# Patient Record
Sex: MALE | Race: WHITE | NOT HISPANIC OR LATINO | Employment: UNEMPLOYED | URBAN - METROPOLITAN AREA
[De-identification: names, ages, dates, MRNs, and addresses within clinical notes are randomized per-mention and may not be internally consistent; named-entity substitution may affect disease eponyms.]

---

## 2017-05-10 ENCOUNTER — OFFICE VISIT - HEALTHEAST (OUTPATIENT)
Dept: FAMILY MEDICINE | Facility: CLINIC | Age: 27
End: 2017-05-10

## 2017-05-10 DIAGNOSIS — Z71.84 COUNSELING FOR TRAVEL: ICD-10-CM

## 2017-09-28 ENCOUNTER — AMBULATORY - HEALTHEAST (OUTPATIENT)
Dept: LAB | Facility: CLINIC | Age: 27
End: 2017-09-28

## 2017-09-28 DIAGNOSIS — Z79.899 ENCOUNTER FOR LONG-TERM (CURRENT) USE OF OTHER MEDICATIONS: ICD-10-CM

## 2019-06-14 ENCOUNTER — COMMUNICATION - HEALTHEAST (OUTPATIENT)
Dept: SCHEDULING | Facility: CLINIC | Age: 29
End: 2019-06-14

## 2019-06-14 ENCOUNTER — OFFICE VISIT - HEALTHEAST (OUTPATIENT)
Dept: FAMILY MEDICINE | Facility: CLINIC | Age: 29
End: 2019-06-14

## 2019-06-14 DIAGNOSIS — H60.12 CELLULITIS OF AURICLE OF LEFT EAR: ICD-10-CM

## 2019-06-14 DIAGNOSIS — H60.392 INFECTIVE OTITIS EXTERNA, LEFT: ICD-10-CM

## 2020-01-05 ENCOUNTER — OFFICE VISIT - HEALTHEAST (OUTPATIENT)
Dept: FAMILY MEDICINE | Facility: CLINIC | Age: 30
End: 2020-01-05

## 2020-01-05 DIAGNOSIS — J11.1 INFLUENZA-LIKE ILLNESS: ICD-10-CM

## 2020-01-05 DIAGNOSIS — R69 ILLNESS, UNSPECIFIED: ICD-10-CM

## 2020-01-05 DIAGNOSIS — R06.2 WHEEZING: ICD-10-CM

## 2020-01-05 LAB
FLUAV AG SPEC QL IA: NORMAL
FLUBV AG SPEC QL IA: NORMAL

## 2020-01-05 RX ORDER — IBUPROFEN 200 MG
200-400 TABLET ORAL
Status: SHIPPED | COMMUNITY
Start: 2020-01-05

## 2020-01-05 RX ORDER — BRIVARACETAM 100 MG/1
250 TABLET, FILM COATED ORAL 2 TIMES DAILY
Status: SHIPPED | COMMUNITY
Start: 2019-12-23 | End: 2022-12-11

## 2020-01-05 RX ORDER — LACOSAMIDE 100 MG/1
550 TABLET ORAL
Status: SHIPPED | COMMUNITY
Start: 2019-07-22

## 2020-07-21 ENCOUNTER — AMBULATORY - HEALTHEAST (OUTPATIENT)
Dept: FAMILY MEDICINE | Facility: CLINIC | Age: 30
End: 2020-07-21

## 2020-07-21 ENCOUNTER — OFFICE VISIT - HEALTHEAST (OUTPATIENT)
Dept: FAMILY MEDICINE | Facility: CLINIC | Age: 30
End: 2020-07-21

## 2020-07-21 DIAGNOSIS — R10.13 ABDOMINAL PAIN, EPIGASTRIC: ICD-10-CM

## 2020-07-21 DIAGNOSIS — Z00.00 ROUTINE GENERAL MEDICAL EXAMINATION AT A HEALTH CARE FACILITY: ICD-10-CM

## 2020-07-21 RX ORDER — PREGABALIN 150 MG/1
450 CAPSULE ORAL
Status: SHIPPED | COMMUNITY
Start: 2020-06-24 | End: 2022-12-11

## 2020-07-21 ASSESSMENT — MIFFLIN-ST. JEOR: SCORE: 1992.73

## 2020-07-22 ENCOUNTER — AMBULATORY - HEALTHEAST (OUTPATIENT)
Dept: LAB | Facility: CLINIC | Age: 30
End: 2020-07-22

## 2020-07-22 DIAGNOSIS — Z00.00 ROUTINE GENERAL MEDICAL EXAMINATION AT A HEALTH CARE FACILITY: ICD-10-CM

## 2020-07-22 DIAGNOSIS — R10.13 ABDOMINAL PAIN, EPIGASTRIC: ICD-10-CM

## 2020-07-22 LAB
ALBUMIN SERPL-MCNC: 4.6 G/DL (ref 3.5–5)
ALP SERPL-CCNC: 67 U/L (ref 45–120)
ALT SERPL W P-5'-P-CCNC: 35 U/L (ref 0–45)
ANION GAP SERPL CALCULATED.3IONS-SCNC: 12 MMOL/L (ref 5–18)
AST SERPL W P-5'-P-CCNC: 23 U/L (ref 0–40)
BASOPHILS # BLD AUTO: 0.1 THOU/UL (ref 0–0.2)
BASOPHILS NFR BLD AUTO: 1 % (ref 0–2)
BILIRUB SERPL-MCNC: 0.9 MG/DL (ref 0–1)
BUN SERPL-MCNC: 16 MG/DL (ref 8–22)
CALCIUM SERPL-MCNC: 9.2 MG/DL (ref 8.5–10.5)
CHLORIDE BLD-SCNC: 109 MMOL/L (ref 98–107)
CHOLEST SERPL-MCNC: 179 MG/DL
CO2 SERPL-SCNC: 20 MMOL/L (ref 22–31)
CREAT SERPL-MCNC: 1.12 MG/DL (ref 0.7–1.3)
EOSINOPHIL # BLD AUTO: 0.2 THOU/UL (ref 0–0.4)
EOSINOPHIL NFR BLD AUTO: 2 % (ref 0–6)
ERYTHROCYTE [DISTWIDTH] IN BLOOD BY AUTOMATED COUNT: 12.3 % (ref 11–14.5)
FASTING STATUS PATIENT QL REPORTED: YES
GFR SERPL CREATININE-BSD FRML MDRD: >60 ML/MIN/1.73M2
GLUCOSE BLD-MCNC: 82 MG/DL (ref 70–125)
HCT VFR BLD AUTO: 49.1 % (ref 40–54)
HDLC SERPL-MCNC: 34 MG/DL
HGB BLD-MCNC: 16.7 G/DL (ref 14–18)
LDLC SERPL CALC-MCNC: 76 MG/DL
LYMPHOCYTES # BLD AUTO: 2 THOU/UL (ref 0.8–4.4)
LYMPHOCYTES NFR BLD AUTO: 27 % (ref 20–40)
MCH RBC QN AUTO: 29.7 PG (ref 27–34)
MCHC RBC AUTO-ENTMCNC: 34 G/DL (ref 32–36)
MCV RBC AUTO: 87 FL (ref 80–100)
MONOCYTES # BLD AUTO: 0.7 THOU/UL (ref 0–0.9)
MONOCYTES NFR BLD AUTO: 9 % (ref 2–10)
NEUTROPHILS # BLD AUTO: 4.4 THOU/UL (ref 2–7.7)
NEUTROPHILS NFR BLD AUTO: 60 % (ref 50–70)
PLATELET # BLD AUTO: 185 THOU/UL (ref 140–440)
PMV BLD AUTO: 11.4 FL (ref 8.5–12.5)
POTASSIUM BLD-SCNC: 3.8 MMOL/L (ref 3.5–5)
PROT SERPL-MCNC: 7 G/DL (ref 6–8)
RBC # BLD AUTO: 5.62 MILL/UL (ref 4.4–6.2)
SODIUM SERPL-SCNC: 141 MMOL/L (ref 136–145)
TRIGL SERPL-MCNC: 343 MG/DL
WBC: 7.4 THOU/UL (ref 4–11)

## 2020-08-11 ENCOUNTER — OFFICE VISIT - HEALTHEAST (OUTPATIENT)
Dept: FAMILY MEDICINE | Facility: CLINIC | Age: 30
End: 2020-08-11

## 2020-08-11 DIAGNOSIS — M62.81 MUSCLE WEAKNESS (GENERALIZED): ICD-10-CM

## 2020-08-11 DIAGNOSIS — R21 RASH: ICD-10-CM

## 2020-08-11 LAB
ALBUMIN SERPL-MCNC: 4.8 G/DL (ref 3.5–5)
ALBUMIN UR-MCNC: NEGATIVE MG/DL
ALP SERPL-CCNC: 63 U/L (ref 45–120)
ALT SERPL W P-5'-P-CCNC: 66 U/L (ref 0–45)
ANION GAP SERPL CALCULATED.3IONS-SCNC: 12 MMOL/L (ref 5–18)
APPEARANCE UR: CLEAR
AST SERPL W P-5'-P-CCNC: 35 U/L (ref 0–40)
ATRIAL RATE - MUSE: 54 BPM
BASOPHILS # BLD AUTO: 0 THOU/UL (ref 0–0.2)
BASOPHILS NFR BLD AUTO: 1 % (ref 0–2)
BILIRUB SERPL-MCNC: 1.1 MG/DL (ref 0–1)
BILIRUB UR QL STRIP: NEGATIVE
BUN SERPL-MCNC: 12 MG/DL (ref 8–22)
CALCIUM SERPL-MCNC: 10 MG/DL (ref 8.5–10.5)
CHLORIDE BLD-SCNC: 108 MMOL/L (ref 98–107)
CO2 SERPL-SCNC: 21 MMOL/L (ref 22–31)
COLOR UR AUTO: YELLOW
CREAT SERPL-MCNC: 1.09 MG/DL (ref 0.7–1.3)
DIASTOLIC BLOOD PRESSURE - MUSE: NORMAL
EOSINOPHIL # BLD AUTO: 0.2 THOU/UL (ref 0–0.4)
EOSINOPHIL NFR BLD AUTO: 3 % (ref 0–6)
ERYTHROCYTE [DISTWIDTH] IN BLOOD BY AUTOMATED COUNT: 12.1 % (ref 11–14.5)
GFR SERPL CREATININE-BSD FRML MDRD: >60 ML/MIN/1.73M2
GLUCOSE BLD-MCNC: 94 MG/DL (ref 70–125)
GLUCOSE UR STRIP-MCNC: NEGATIVE MG/DL
HCT VFR BLD AUTO: 52.2 % (ref 40–54)
HGB BLD-MCNC: 17.7 G/DL (ref 14–18)
HGB UR QL STRIP: NEGATIVE
INTERPRETATION ECG - MUSE: NORMAL
KETONES UR STRIP-MCNC: NEGATIVE MG/DL
LEUKOCYTE ESTERASE UR QL STRIP: NEGATIVE
LYMPHOCYTES # BLD AUTO: 1.4 THOU/UL (ref 0.8–4.4)
LYMPHOCYTES NFR BLD AUTO: 23 % (ref 20–40)
MCH RBC QN AUTO: 30.7 PG (ref 27–34)
MCHC RBC AUTO-ENTMCNC: 33.9 G/DL (ref 32–36)
MCV RBC AUTO: 91 FL (ref 80–100)
MONOCYTES # BLD AUTO: 0.5 THOU/UL (ref 0–0.9)
MONOCYTES NFR BLD AUTO: 7 % (ref 2–10)
NEUTROPHILS # BLD AUTO: 4.1 THOU/UL (ref 2–7.7)
NEUTROPHILS NFR BLD AUTO: 67 % (ref 50–70)
NITRATE UR QL: NEGATIVE
P AXIS - MUSE: 41 DEGREES
PH UR STRIP: 6.5 [PH] (ref 5–8)
PLATELET # BLD AUTO: 161 THOU/UL (ref 140–440)
PMV BLD AUTO: 9.1 FL (ref 7–10)
POTASSIUM BLD-SCNC: 4.1 MMOL/L (ref 3.5–5)
PR INTERVAL - MUSE: 160 MS
PROT SERPL-MCNC: 7.6 G/DL (ref 6–8)
QRS DURATION - MUSE: 96 MS
QT - MUSE: 414 MS
QTC - MUSE: 392 MS
R AXIS - MUSE: 60 DEGREES
RBC # BLD AUTO: 5.76 MILL/UL (ref 4.4–6.2)
SODIUM SERPL-SCNC: 141 MMOL/L (ref 136–145)
SP GR UR STRIP: 1.01 (ref 1–1.03)
SYSTOLIC BLOOD PRESSURE - MUSE: NORMAL
T AXIS - MUSE: 3 DEGREES
TSH SERPL DL<=0.005 MIU/L-ACNC: 2.28 UIU/ML (ref 0.3–5)
UROBILINOGEN UR STRIP-ACNC: NORMAL
VENTRICULAR RATE- MUSE: 54 BPM
WBC: 6.2 THOU/UL (ref 4–11)

## 2020-08-12 LAB — B BURGDOR IGG+IGM SER QL: 0.03 INDEX VALUE

## 2020-08-13 ENCOUNTER — AMBULATORY - HEALTHEAST (OUTPATIENT)
Dept: FAMILY MEDICINE | Facility: CLINIC | Age: 30
End: 2020-08-13

## 2020-08-13 DIAGNOSIS — M62.81 MUSCLE WEAKNESS (GENERALIZED): ICD-10-CM

## 2020-08-13 LAB
A PHAGOCYTOPH IGG TITR SER IF: NORMAL {TITER}
A PHAGOCYTOPH IGM TITR SER IF: NORMAL {TITER}
E CHAFFEENSIS IGG TITR SER IF: NORMAL {TITER}
E CHAFFEENSIS IGM TITR SER IF: NORMAL {TITER}

## 2020-08-15 ENCOUNTER — COMMUNICATION - HEALTHEAST (OUTPATIENT)
Dept: SCHEDULING | Facility: CLINIC | Age: 30
End: 2020-08-15

## 2020-08-15 LAB
B MICROTI DNA BLD QL NAA+PROBE: NOT DETECTED
BABESIA DNA BLD QL NAA+PROBE: NOT DETECTED

## 2020-09-23 ENCOUNTER — RECORDS - HEALTHEAST (OUTPATIENT)
Dept: ADMINISTRATIVE | Facility: OTHER | Age: 30
End: 2020-09-23

## 2020-10-26 ENCOUNTER — RECORDS - HEALTHEAST (OUTPATIENT)
Dept: ADMINISTRATIVE | Facility: OTHER | Age: 30
End: 2020-10-26

## 2020-10-30 ENCOUNTER — RECORDS - HEALTHEAST (OUTPATIENT)
Dept: ADMINISTRATIVE | Facility: OTHER | Age: 30
End: 2020-10-30

## 2020-12-09 ENCOUNTER — RECORDS - HEALTHEAST (OUTPATIENT)
Dept: ADMINISTRATIVE | Facility: OTHER | Age: 30
End: 2020-12-09

## 2021-01-08 ENCOUNTER — AMBULATORY - HEALTHEAST (OUTPATIENT)
Dept: CARE COORDINATION | Facility: CLINIC | Age: 31
End: 2021-01-08

## 2021-01-08 DIAGNOSIS — R68.89 SPELLS OF DECREASED ATTENTIVENESS: ICD-10-CM

## 2021-01-11 ENCOUNTER — COMMUNICATION - HEALTHEAST (OUTPATIENT)
Dept: NURSING | Facility: CLINIC | Age: 31
End: 2021-01-11

## 2021-02-03 ENCOUNTER — RECORDS - HEALTHEAST (OUTPATIENT)
Dept: ADMINISTRATIVE | Facility: OTHER | Age: 31
End: 2021-02-03

## 2021-03-13 ENCOUNTER — RECORDS - HEALTHEAST (OUTPATIENT)
Dept: ADMINISTRATIVE | Facility: OTHER | Age: 31
End: 2021-03-13

## 2021-03-14 ENCOUNTER — RECORDS - HEALTHEAST (OUTPATIENT)
Dept: ADMINISTRATIVE | Facility: OTHER | Age: 31
End: 2021-03-14

## 2021-05-02 ENCOUNTER — HEALTH MAINTENANCE LETTER (OUTPATIENT)
Age: 31
End: 2021-05-02

## 2021-05-29 NOTE — TELEPHONE ENCOUNTER
Call from pharmacy      Ciprodex not covered     Requesting alternative       [facility] [place]       Patient waiting at pharmacy        Tele# [phone]        Ricky Tolbert RN   Triage and Medication Refills

## 2021-05-29 NOTE — PROGRESS NOTES
Assessment/Plan:   Infective otitis externa, left  Cellulitis of auricle of left ear  Left external ear canal swelling and redness with purulent debris obstructing the TM. Redness, warmth and tenderness outside the meatus of the canal as well. Cannot rule out otitis media. Insurance didn't cover ciprodex, replaced with cortisporin along with cefdinir orally. Remote history of penicillin allergy  I discussed red flag symptoms, return precautions to clinic/ER and follow up care with patient/parent.  Expected clinical course, symptomatic cares advised. Questions answered. Patient/parent amenable with plan.  - neomycin-polymyxin-hydrocortisone (CORTISPORIN) otic solution; Administer 3 drops into the left ear 4 (four) times a day for 10 days.  Dispense: 10 mL; Refill: 0  - cefdinir (OMNICEF) 300 MG capsule; Take 1 capsule (300 mg total) by mouth 2 (two) times a day for 10 days. Take with food. Take probiotic while on antibiotic.  Dispense: 20 capsule; Refill: 0    Cipro-dex drops twice a day for 7 days left ear - this was changed to cortisporin four times a day for 10 days due to insurance coverage (lack of)  Cefdinir twice a day for 10 days  Recheck if worse or no better    Subjective:      Phoenix Win is a 28 y.o. male who presents with ear pain and fullness left side, for the last 2 days. He developed URI about a week ago which is better except for a mild lingering dry cough. No sinus pain, pressure, congestion anymore. 2 days ago his left ear started to feel plugged and last night and this morning his hearing has been decreased. The ear has become tender to touch and move. He has noted 'runny ear wax' from the ear for 2 days. No fever. No HA, no vertigo. No recent swimming, no plane travel. No injury or trauma. He has history of OM. No N/V/D, no rash. Non smoker.     Allergies   Allergen Reactions     Amoxicillin      Penicillins      Current Outpatient Medications on File Prior to Visit   Medication Sig  Dispense Refill     calcium-vitamin D 500 mg(1,250mg) -200 unit per tablet Take 1 tablet by mouth every morning.       levETIRAcetam (KEPPRA) 1000 MG tablet Take 1,500 mg by mouth 2 (two) times a day.        atovaquone-proguanil (MALARONE) 250-100 mg Tab Take 1 tablet by mouth daily with breakfast. Start 2 days before travel and continue until 7 days after you have returned to [place] tablet 0     CARBATROL 200 mg 12 hr capsule Take 400 mg by mouth 2 (two) times a day.        VIMPAT 200 mg Tab 250 mg.         3     No current facility-administered medications on file prior to visit.      Patient Active Problem List   Diagnosis     Epilepsy       Objective:     /81 (Patient Site: Right Arm, Patient Position: Sitting, Cuff Size: Adult Regular)   Pulse 72   Temp 98.7  F (37.1  C) (Oral)   Resp 15   Wt 220 lb (99.8 kg)   SpO2 96%   BMI 34.46 kg/m      Physical  General Appearance: Alert, cooperative, no distress, AVSS  Head: Normocephalic, without obvious abnormality, atraumatic  Eyes: Conjunctivae are normal.   Ears: Normal TM and external ear canal on the right. The left ear is painful with retraction of the pinna and pressure on the tragus. Left external ear canal swelling and redness with purulent debris obstructing view of the TM. Redness, warmth and tenderness of a portion of the pinna outside the meatus of the canal as well. Decreased hearing left side  Nose: No significant congestion. No sinus pain with percussion  Throat: Throat is normal.  No exudate.  No significant lesions  Neck: No adenopathy  Lungs: Clear to auscultation bilaterally, respirations unlabored  Heart: Regular rate and rhythm  Skin: Skin color, texture, turgor normal, no rashes or lesions  Psychiatric: Patient has a normal mood and affect.

## 2021-05-29 NOTE — PATIENT INSTRUCTIONS - HE
Cipro-dex drops twice a day for 7 days left ear  Cefdinir twice a day for 10 days  Recheck if worse or no better

## 2021-05-30 VITALS — BODY MASS INDEX: 33.45 KG/M2 | WEIGHT: 213.56 LBS

## 2021-06-03 VITALS
TEMPERATURE: 98.4 F | DIASTOLIC BLOOD PRESSURE: 82 MMHG | OXYGEN SATURATION: 96 % | HEART RATE: 85 BPM | BODY MASS INDEX: 36.18 KG/M2 | SYSTOLIC BLOOD PRESSURE: 123 MMHG | WEIGHT: 231 LBS | RESPIRATION RATE: 20 BRPM

## 2021-06-03 VITALS — WEIGHT: 220 LBS | BODY MASS INDEX: 34.46 KG/M2

## 2021-06-04 VITALS
TEMPERATURE: 98.6 F | DIASTOLIC BLOOD PRESSURE: 82 MMHG | RESPIRATION RATE: 15 BRPM | WEIGHT: 241 LBS | HEART RATE: 69 BPM | OXYGEN SATURATION: 98 % | SYSTOLIC BLOOD PRESSURE: 126 MMHG | BODY MASS INDEX: 37.75 KG/M2

## 2021-06-04 VITALS
HEIGHT: 67 IN | DIASTOLIC BLOOD PRESSURE: 78 MMHG | SYSTOLIC BLOOD PRESSURE: 132 MMHG | BODY MASS INDEX: 37.51 KG/M2 | WEIGHT: 239 LBS | HEART RATE: 75 BPM | OXYGEN SATURATION: 98 %

## 2021-06-04 NOTE — PATIENT INSTRUCTIONS - HE
No pneumonia on xray today.    Start remaining prednisone tomorrow.    Albuterol inhaler with spacer every 4 hours for wheezing and shortness of breath.    If your symptoms worsen in any way, go immediately to the ER.

## 2021-06-04 NOTE — PROGRESS NOTES
ASSESSMENT:   1. Influenza-like illness  Influenza A/B Rapid Test    XR Chest 2 Views   2. Wheezing  ipratropium-albuterol 0.5-2.5 mg/3 mL nebulizer solution 3 mL (DUO-NEB)    predniSONE tablet 60 mg (DELTASONE)    ipratropium-albuterol 0.5-2.5 mg/3 mL nebulizer solution 3 mL (DUO-NEB)    predniSONE (DELTASONE) 20 MG tablet    albuterol (PROAIR HFA;PROVENTIL HFA;VENTOLIN HFA) 90 mcg/actuation inhaler    Spacer W/O Mask       PLAN:  Chest x-ray without infiltrate or effusion on my review.  Influenza swab negative.  Patient did have 2 DuoNeb's in clinic with significant symptomatic improvement and significant improvement in wheezing.  Was given a 60 mg dose of prednisone here in clinic, will continue prednisone at 40 mg daily for the next 4 days.  Prescribed an albuterol inhaler to use every 4-6 hours as needed at home.  Did discuss with patient that he should have a very low threshold for reevaluation should fevers or worsening symptoms redevelop.  He is amenable with this plan.    I discussed red flag symptoms, return precautions to clinic/ER and follow up care with patient/parent.  Expected clinical course, symptomatic cares advised. Questions answered. Patient/parent amenable with plan.    Patient Instructions:  Patient Instructions   No pneumonia on xray today.    Start remaining prednisone tomorrow.    Albuterol inhaler with spacer every 4 hours for wheezing and shortness of breath.    If your symptoms worsen in any way, go immediately to the ER.      SUBJECTIVE:   Phoenix Win is a 29 y.o. male who presents today with cough, productive with green sputum for the past 4 days.  shortness of breath. CONROY. No hemoptysis.  No chest pain.  Did have influenza vaccine this year.  No known ill contacts.      ROS:  Comprehensive 12 pt ROS completed, positives noted in HPI, otherwise negative.      Past Medical History:  Patient Active Problem List   Diagnosis     Epilepsy       Surgical History:  No past surgical  history on file.        Family History:  No family history on file.    Reviewed; Non-contributory    Social History     Tobacco Use   Smoking Status Never Smoker   Smokeless Tobacco Never Used       Current Medications:  Current Outpatient Medications on File Prior to Visit   Medication Sig Dispense Refill     atovaquone-proguanil (MALARONE) 250-100 mg Tab Take 1 tablet by mouth daily with breakfast. Start 2 days before travel and continue until 7 days after you have returned to [place] tablet 0     BRIVIACT 100 mg Tab tablet TAKE 2 TABLETS BY MOUTH WITH ONE 50MG TABLET TWICE DAILY TOTAL DOSE IS 250MG TWICE DAILY       BRIVIACT 50 mg Tab tablet TAKE ONE TABLET WITH TWO 100MG TABLETS TWICE DAILY FOR A TOTAL DOSE  TWICE DAILY       calcium carbonate (OS-ESSENCE) 600 mg calcium (1,500 mg) tablet Take 600 mg by mouth.       calcium-vitamin D 500 mg(1,250mg) -200 unit per tablet Take 1 tablet by mouth every morning.       CARBATROL 200 mg 12 hr capsule Take 400 mg by mouth 2 (two) times a day.        lacosamide (VIMPAT) 100 mg Tab Take 250 mg (2.5 tab) oral every AM and 300 mg (3 tab) every PM.       levETIRAcetam (KEPPRA) 1000 MG tablet Take 1,500 mg by mouth 2 (two) times a day.        LORazepam (ATIVAN) 2 mg/mL concentrated solution Give 1 mL (2mg) for generalized tonic clonic seizure > 3 min. Call 911 if seizure continues additional 10 min. Max dose 8mg/24hr.       neomycin-polymyxin-hydrocortisone (CORTISPORIN) 3.5-10,000-1 mg/mL-unit/mL-% otic suspension Administer 3 drops into the left ear 4 (four) times a day for 10 days.       VIMPAT 200 mg Tab 250 mg.         3     ibuprofen (ADVIL,MOTRIN) 200 MG tablet Take 200-400 mg by mouth.       No current facility-administered medications on file prior to visit.        Allergies:   Allergies   Allergen Reactions     Penicillins      Amoxicillin Rash       OBJECTIVE:   Vitals:    01/05/20 1403   BP: 123/82   Patient Site: Right Arm   Patient Position: Sitting   Pulse:  85   Resp: 20   Temp: 98.4  F (36.9  C)   TempSrc: Oral   SpO2: 96%   Weight: (!) 231 lb (104.8 kg)     Physical exam reveals a pleasant 29 y.o. male.   General: Appears healthy, alert and cooperative. Non-toxic appearance.  Eyes:  No conjunctivitis, lids normal.   Ears:  Normal appearing auricle. External auditory meatus without excessive cerumen, edema or erythema. normal TMs bilaterally and normal canals bilaterally.  No mastoid tenderness. No pain with palpation over tragus.  Nose:    Mucosa normal. Clear rhinorrhea. No sinus tenderness.  Mouth:  Mucosa pink and moist.  no pharyngitis, no exudate. No trismus. No evidence of PTA. Normal phonation.  Neck: no adenopathy  Pulmonary/Chest: Diminished breath sounds, expiratory wheezes bilaterally. Symmetric air entry throughout both lung fields. Symmetrical chest wall movement.  Heart: regular rate and rhythm, no murmur, rub or gallop  Neuro: Alert, oriented. Non-focal.  Skin: pink, warm, dry, and without lesions on limited skin exam. No rashes.  Psychiatric: Normal mood and affect.  Normal judgement and thought content. Normal behavior.       RADIOLOGY    Xr Chest 2 Views    Result Date: 1/5/2020  EXAM: XR CHEST 2 VIEWS LOCATION: [facility] DATE/TIME: 1/5/2020 2:52 PM INDICATION: fever, cough, sob COMPARISON: 01/10/2015     Negative chest.      LABORATORY STUDIES    Recent Results (from the past 24 hour(s))   Influenza A/B Rapid Test   Result Value Ref Range    Influenza  A, Rapid Antigen No Influenza A antigen detected No Influenza A antigen detected    Influenza B, Rapid Antigen No Influenza B antigen detected No Influenza B antigen detected           Cyndi Marrero, CNP

## 2021-06-09 NOTE — PROGRESS NOTES
" Patient ID: Phoenix Win is a 30 y.o. male.  /78   Pulse 75   Ht 5' 7\" (1.702 m)   Wt (!) 239 lb (108.4 kg)   SpO2 98%   BMI 37.43 kg/m      Assessment/Plan:                   Diagnoses and all orders for this visit:    Routine general medical examination at a health care facility  -     Comprehensive Metabolic Panel; Future; Expected date: 07/21/2020  -     HM1(CBC and Differential); Future; Expected date: 07/21/2020  -     Lipid [place] FASTING; Future; Expected date: 07/21/2020    Abdominal pain, epigastric  -     HM1(CBC and Differential); Future; Expected date: 07/21/2020           DISCUSSION  Discussed routine health prevention.  Recommend returning for fasting labs including blood sugar and cholesterol.  Recommend increasing frequency of exercise.  Recommend improvement in diet where appropriate.  Immunizations are up-to-date.  No indication for any specific cancer screening tests at this point.    Abdominal discomfort likely representative of intermittent mild gastritis possibly associated with NSAIDs.  Recommend taking NSAIDs only with food and keeping treat minimum as possible.  We will check additional labs including hemogram.  Subjective:     HPI    Phoenix Win is a 30-year-old man who is here today for routine physical.  His medical history includes seizure disorder diagnosed as epilepsy and follows with neurology.  History of seizures for 19 years.  He has generalized tonic-clonic seizures.  On occasion has other types of seizures.  Reports last seizure was last Saturday.  Patient reports he works closely with his neurologist with medication adjustments.  He seems to be doing relatively well most recently.  We reviewed his current medications.  Reviewed other records that were pertinent.    Other medical history includes hospitalization for pneumonia back in 2015.  He does not have any history of high blood pressure, high blood sugar, he does have a history of elevated " triglycerides.  He has not had any surgeries.    Family history significant for father with gout, mother with thyroid disorder, brother with gout, mother with arthritis, father with history of ulcers, 2 younger sisters that are generally healthy but seem to pass out easily, maternal grandfather with gout and history of heart disease, paternal grandfather  of some type of cancer possibly related to chemical exposure via.    Social history patient is not .  He resides with his parents, his mother does help him manage seizures when they occur.  He does not drive.  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  He does not smoke no history of smoking.  Does not drink alcohol or caffeine.    Review of systems patient describes having intermittent left sided to epigastric abdominal discomfort that lasts 30 minutes to an hour occurs sporadically has not occurred in 1 week.    Denies more specifically concerns such as chest pain shortness of breath dizziness lightheadedness syncope.      Review of Systems  Complete review of systems is obtained.  Other than the specific considerations noted above complete review of systems is negative.          Objective:   Medications:  Current Outpatient Medications   Medication Sig     BRIVIACT 100 mg Tab tablet 250 mg 2 (two) times a day. 500mg total per day     calcium-vitamin D 500 mg(1,250mg) -200 unit per tablet Take 1 tablet by mouth every morning.     lacosamide (VIMPAT) 100 mg Tab 550 mg.      pregabalin (LYRICA) 150 MG capsule 450 mg.      ibuprofen (ADVIL,MOTRIN) 200 MG tablet Take 200-400 mg by mouth.       Allergies:  Allergies   Allergen Reactions     Penicillins      Amoxicillin Rash       Tobacco:   reports that he has never smoked. He has never used smokeless tobacco.    HEALTH PREVENTION    General  Dental care: Discussed the importance of  "regular dental care.  Eye care: Discussed importance of routine eye exams for glaucoma screening      Wt Readings from Last 3 Encounters:   07/21/20 (!) 239 lb (108.4 kg)   01/05/20 (!) 231 lb (104.8 kg)   06/14/19 220 lb (99.8 kg)     Body mass index is 37.43 kg/m .    The following high BMI interventions were performed this visit: encouragement to exercise    Cancer screening    Indication for any specific cancer screening tests    Cholesterol:   Y  LDL Calculated (no units)   Date Value   10/03/2013   Invalid, Triglyceride >300      Blood Pressure:   BP Readings from Last 3 Encounters:   07/21/20 132/78   01/05/20 123/82   06/14/19 123/81       Immunization History   Administered Date(s) Administered     DTP 1988, 1988, 1988, 02/13/1990, 06/30/1993     Hep A, Adult IM (19yr & older) 05/10/2017     Influenza high dose,seasonal,PF, 65+ yrs 09/04/2017     Influenza, inj, historic,unspecified 10/15/1998, 10/14/1999, 11/25/2003, 10/25/2007, 11/28/2008, 10/03/2009, 12/15/2013, 09/04/2017     Influenza,seasonal, Inj IIV3 10/15/1998, 10/14/1999, 11/25/2003, 10/25/2007, 11/28/2008, 10/03/2009, 12/15/2013     Influenza,seasonal,quad inj =/> 6months 10/09/2017, 11/01/2018     MMR 11/06/1989, 06/30/1993     Meningococcal MCV4 Conjugate,Unspecified 11/06/2007     Meningococcal MCV4P 11/06/2007     OPV,Trivalent,Historic(7103-7100 only) 1988, 1988, 02/13/1990, 06/30/1993     Pneumo Polysac 23-V 01/13/2015     Td, adult adsorbed, PF 06/01/2006     Tdap 05/10/2017     Typhoid Live, Oral 05/10/2017     Varicella 04/04/1996     There are no preventive care reminders to display for this patient.     Physical Exam          /78   Pulse 75   Ht 5' 7\" (1.702 m)   Wt (!) 239 lb (108.4 kg)   SpO2 98%   BMI 37.43 kg/m            General Appearance:    Alert, cooperative, no distress   Eyes:   No scleral icterus or conjunctival irritation       Ears:    Normal TM's and external ear canals, both ears "   Throat:   Lips, mucosa, and tongue normal; teeth and gums normal   Neck:   Supple, symmetrical, trachea midline, no adenopathy;        thyroid:  No enlargement/tenderness/nodules   Lungs:     Clear to auscultation bilaterally, respirations unlabored, no wheezes or crackles   Heart:    Regular rate and rhythm,  No murmur   Abdomen:    Soft, no distention, no tenderness on palpation, no masses, no organomegaly     Extremities:  No edema, no joint swelling or redness, no evidence of any injuries   Skin:  No concerning skin findings, no suspicious moles, no rashes   Neurologic:  On gross examination there is no motor or sensory deficit.  Patient walks with a normal gait     Genitalia:   Normal testicular anatomy, no inguinal hernias, no skin findings in the genital region

## 2021-06-10 NOTE — PROGRESS NOTES
Assessment  1. Counseling for travel         Plan    1.  Travel consultation: Reviewed CDC recommendations for travel to [place].  Administered Adacel and hepatitis A vaccines today in clinic.  Discussed side effects of vaccines.  Recommend second dose of hepatitis a vaccine in 6 months.  Provided prescription for oral typhoid vaccine and counseled on use and side effects.  Provided prescription for Japanese encephalitis vaccine and directed him to a local travel clinic for this vaccine.  Prescription for Malarone for malaria prophylaxis was provided.  Discussed he should start this 2 days before travel and continue daily until 7 days after he has returned to the [place].  Also provided prescription for Cipro to use as needed for traveler's diarrhea.  Discussed importance of food and water safety.  Discussed importance of repellent and precautions against mosquito and tick bites.  Discussed safe travel practices and importance of use of sunscreen.      Subjective  Phoenix Win is a 26 y.o. male who comes in today for travel consultation.  He is planning to travel to [place] leaving June 13 and returning August 13th.  He will be doing an intensive Chinese language program.  Most of his time will be in the city of [facility].  He is due for a tetanus booster.  He has had all 3 of his hepatitis B vaccine injections.  He has not had an hepatitis A vaccine series.  He has a seizure disorder and takes medication for management of that.  Otherwise healthy individual.  Allergies and medications are reviewed and updated in the chart.  No other concerns or questions today.    Current Outpatient Prescriptions   Medication Sig Dispense Refill     calcium-vitamin D 500 mg(1,250mg) -200 unit per tablet Take 1 tablet by mouth every morning.       CARBATROL 200 mg 12 hr capsule Take 400 mg by mouth 2 (two) times a day.        levETIRAcetam (KEPPRA) 1000 MG tablet Take 1,500 mg by mouth 2 (two) times a day.         atovaquone-proguanil (MALARONE) 250-100 mg Tab Take 1 tablet by mouth daily with breakfast. Start 2 days before travel and continue until 7 days after you have returned to [place] tablet 0     ciprofloxacin HCl (CIPRO) 500 MG tablet Take 1 tablet (500 mg total) by mouth 2 (two) times a day for 3 days. Take as needed for traveler's diarrhea 6 tablet 0     typhoid (VIVOTIF) SR capsule Take 1 capsule by mouth every other day for 7 days. 4 capsule 0     No current facility-administered medications for this visit.          Objective   /70 (Patient Site: Left Arm, Patient Position: Sitting, Cuff Size: Adult Large)  Pulse 71  Temp 98.6  F (37  C) (Tympanic)   Wt 213 lb 9 oz (96.9 kg)  SpO2 97%  BMI 33.45 kg/m2      Gen: alert, no acute distress  HEENT;  NCAT  Neck: supple  Ext: warm, dry, no edema  Psych: mood appropriate, affect normal

## 2021-06-10 NOTE — PROGRESS NOTES
Assessment & Plan:       1. Muscle weakness (generalized)  HM1(CBC and Differential)    Urinalysis-UC if Indicated    Symptomatic COVID-19 Virus (CORONAVIRUS) PCR    Comprehensive Metabolic Panel    Lyme Antibody Cascade    Anaplasma phagocytophilum (HGA) Antibodies, IgG and IgM    Ehrlichia chaffeensis Antibodies, IgG / IgM by IFA    Babesia Species by PCR    Thyroid Stimulating Hormone (TSH)    Electrocardiogram Perform and Read    HM1 (CBC with Diff)   2. Rash  triamcinolone (KENALOG) 0.1 % cream      Medical Decision Making  Patient presents for multiple symptoms including muscle fatigue, dizziness, and rash.  Some of his symptoms have been borderline chronic since changing his epilepsy medications.  These include the general fatigue and dizziness described as imbalance.  It appears that these are side effects of the patient's medications.  However, more concerning is the patient's acute onset muscle fatigue and rash.  The rash does appear most consistent with patient's previous diagnosis of eczema versus side effect of the patient's medications in conjunction with sun exposure.  Will prescribe a stronger topical steroid and recommend patient continue to avoid sun exposure.  The rash otherwise does not appear to be cellulitis, contact dermatitis, or an allergic reaction.  Differential for muscle fatigue includes but is not limited to COVID-19, hypoglycemia, anemia, cardiac etiology, hypothyroidism, urinary tract infection, and tickborne Illnesses.  Patient's ECG showed normal sinus bradycardia similar to his previous ECG.  CBC shows normal white blood cell count and no signs of anemia.  His urine analysis is also negative for urinary tract infection as well as glucosuria.  There is in process tickborne illness panel, thyroid cascade, and CMP.  Will contact patient by phone when results are abnormal.  Otherwise, if results are normal will push to my chart.  Recommended patient follow-up with his epilepsy care  team to discuss possible side effects of his epilepsy medication being the cause of his symptoms.  Ordered a curbside COVID-19 test.  Discussed self-isolation and prevention of spreading illness.  Discussed signs of worsening symptoms and when to follow-up with PCP if no symptom improvement.    Patient had symptoms possibly consistent with COVID-19.  Thus, protective precautions were taken including appropriate facemask, face shield, gown, and gloves.      Subjective:       Phoenix Win is a 30 y.o. male epilepsy, here for evaluation of dizziness, muscle fatigue, and rash.  Onset of symptoms was 1 to 2 months ago with acute worsening since then.  Patient notes that he developed general fatigue dizziness since changing to his new epilepsy medications.  He is currently taking Briviact, Vimpat, and Lyrica.  Patient has not contacted his epilepsy team to discuss these symptoms yet.  He describes the dizziness as feeling imbalanced.  He otherwise denies lightheadedness and room spinning sensation.  His neuro symptoms then presented over the last 48 hours.  These include muscle weakness bilaterally in the upper and lower extremities.  Patient also has a new rash on the forearms bilaterally.  The rash is described as itchiness.  He does have history of eczema.  However, patient usually is able to use topical steroids with good relief.  He has been using topical steroids for the current rash with no relief.  He also noted having a similar rash when he was traveling/hiking in [place].  He attributed this to the sun exposure.  Patient has not had any recent sun exposure to explain his current rash.  Patient does do frequent hiking in [place]ly [place]ed areas.  He has had trips in [place] in [place] this summer.  He does not recall any known tick bites.  Patient has not been around anyone with known COVID-19.  Associated symptoms include increased urinary frequency and occasional subjective fevers/chills.  Patient  otherwise denies cough, sore throat, chest pains, shortness of breath, nausea, vomiting, abdominal pains, and back pains.    The following portions of the patient's history were reviewed and updated as appropriate: allergies, current medications and problem list.    Review of Systems  A 12 point comprehensive review of systems was negative except as noted.     Allergies  Allergies   Allergen Reactions     Penicillins      Amoxicillin Rash       No family history on file.    Social History     Socioeconomic History     Marital status: Single     Spouse name: None     Number of children: None     Years of education: None     Highest education level: None   Occupational History     None   Social Needs     Financial resource strain: None     Food insecurity     Worry: None     Inability: None     Transportation needs     Medical: None     Non-medical: None   Tobacco Use     Smoking status: Never Smoker     Smokeless tobacco: Never Used   Substance and Sexual Activity     Alcohol use: No     Drug use: No     Sexual activity: Not Currently   Lifestyle     Physical activity     Days per week: None     Minutes per session: None     Stress: None   Relationships     Social connections     Talks on phone: None     Gets together: None     Attends Alevism service: None     Active member of club or organization: None     Attends meetings of clubs or organizations: None     Relationship status: None     Intimate partner violence     Fear of current or ex partner: None     Emotionally abused: None     Physically abused: None     Forced sexual activity: None   Other Topics Concern     None   Social History Narrative     None         Objective:       /82   Pulse 69   Temp 98.6  F (37  C)   Resp 15   Wt (!) 241 lb (109.3 kg)   SpO2 98%   BMI 37.75 kg/m    General appearance: alert, appears stated age, cooperative, no distress and non-toxic  Head: Normocephalic, without obvious abnormality, atraumatic  Ears: normal TM's  and external ear canals both ears  Nose: no discharge  Throat: lips, mucosa, and tongue normal; teeth and gums normal  Neck: no adenopathy and supple, symmetrical, trachea midline  Lungs: clear to auscultation bilaterally  Heart: regular rate and rhythm, S1, S2 normal, no murmur, click, rub or gallop  Skin: Dry/scaled slightly erythematous patches affecting the forearms bilaterally, skin otherwise intact, no significant swelling, no significant increased warmth to touch     Lab Results    Recent Results (from the past 24 hour(s))   Electrocardiogram Perform and Read   Result Value Ref Range    SYSTOLIC BLOOD PRESSURE      DIASTOLIC BLOOD PRESSURE      VENTRICULAR RATE 54 BPM    ATRIAL RATE 54 BPM    P-R INTERVAL 160 ms    QRS DURATION 96 ms    Q-T INTERVAL 414 ms    QTC CALCULATION (BEZET) 392 ms    P Axis 41 degrees    R AXIS 60 degrees    T AXIS 3 degrees    MUSE DIAGNOSIS       Sinus bradycardia  Normal ECG  When compared with ECG of 11-NOV-2015 18:22,  QT has shortened  Confirmed by NORA CHAVARRIA MD LOC:[facility] ([id number]) on 8/11/2020 12:50:13 PM     HM1 (CBC with Diff)   Result Value Ref Range    WBC 6.2 4.0 - 11.0 thou/uL    RBC 5.76 4.40 - 6.20 mill/uL    Hemoglobin 17.7 14.0 - 18.0 g/dL    Hematocrit 52.2 40.0 - 54.0 %    MCV 91 80 - 100 fL    MCH 30.7 27.0 - 34.0 pg    MCHC 33.9 32.0 - 36.0 g/dL    RDW 12.1 11.0 - 14.5 %    Platelets 161 140 - 440 thou/uL    MPV 9.1 7.0 - 10.0 fL    Neutrophils % 67 50 - 70 %    Lymphocytes % 23 20 - 40 %    Monocytes % 7 2 - 10 %    Eosinophils % 3 0 - 6 %    Basophils % 1 0 - 2 %    Neutrophils Absolute 4.1 2.0 - 7.7 thou/uL    Lymphocytes Absolute 1.4 0.8 - 4.4 thou/uL    Monocytes Absolute 0.5 0.0 - 0.9 thou/uL    Eosinophils Absolute 0.2 0.0 - 0.4 thou/uL    Basophils Absolute 0.0 0.0 - 0.2 thou/uL   Urinalysis-UC if Indicated   Result Value Ref Range    Color, UA Yellow Colorless, Yellow, Straw, Light Yellow    Clarity, UA Clear Clear    Glucose, UA Negative Negative     Bilirubin, UA Negative Negative    Ketones, UA Negative Negative    Specific Gravity, UA 1.015 1.005 - 1.030    Blood, UA Negative Negative    pH, UA 6.5 5.0 - 8.0    Protein, UA Negative Negative mg/dL    Urobilinogen, UA 0.2 E.U./dL 0.2 E.U./dL, 1.0 E.U./dL    Nitrite, UA Negative Negative    Leukocytes, UA Negative Negative     I personally reviewed these results and discussed findings with the patient.

## 2021-06-16 PROBLEM — R68.89 SPELLS OF DECREASED ATTENTIVENESS: Status: ACTIVE | Noted: 2019-07-16

## 2021-06-16 PROBLEM — G40.209: Status: ACTIVE | Noted: 2018-05-16

## 2021-06-16 PROBLEM — G40.209 COMPLEX PARTIAL SEIZURES WITH CONSCIOUSNESS IMPAIRED (H): Status: ACTIVE | Noted: 2018-01-10

## 2021-08-22 ENCOUNTER — HEALTH MAINTENANCE LETTER (OUTPATIENT)
Age: 31
End: 2021-08-22

## 2021-10-17 ENCOUNTER — HEALTH MAINTENANCE LETTER (OUTPATIENT)
Age: 31
End: 2021-10-17

## 2021-11-03 ENCOUNTER — TRANSFERRED RECORDS (OUTPATIENT)
Dept: HEALTH INFORMATION MANAGEMENT | Facility: CLINIC | Age: 31
End: 2021-11-03
Payer: COMMERCIAL

## 2021-11-09 ENCOUNTER — MEDICAL CORRESPONDENCE (OUTPATIENT)
Dept: HEALTH INFORMATION MANAGEMENT | Facility: CLINIC | Age: 31
End: 2021-11-09
Payer: COMMERCIAL

## 2021-11-12 ENCOUNTER — MEDICAL CORRESPONDENCE (OUTPATIENT)
Dept: HEALTH INFORMATION MANAGEMENT | Facility: CLINIC | Age: 31
End: 2021-11-12
Payer: COMMERCIAL

## 2021-11-12 DIAGNOSIS — Z79.899 OTHER LONG TERM (CURRENT) DRUG THERAPY: Primary | ICD-10-CM

## 2021-11-12 DIAGNOSIS — G40.209 LOCALIZATION-RELATED PARTIAL EPILEPSY WITH COMPLEX PARTIAL SEIZURES (H): ICD-10-CM

## 2021-11-18 ENCOUNTER — LAB (OUTPATIENT)
Dept: LAB | Facility: CLINIC | Age: 31
End: 2021-11-18
Payer: COMMERCIAL

## 2021-11-18 DIAGNOSIS — G40.209 LOCALIZATION-RELATED PARTIAL EPILEPSY WITH COMPLEX PARTIAL SEIZURES (H): ICD-10-CM

## 2021-11-18 DIAGNOSIS — Z79.899 OTHER LONG TERM (CURRENT) DRUG THERAPY: ICD-10-CM

## 2021-11-18 LAB
ALBUMIN SERPL-MCNC: 4.5 G/DL (ref 3.5–5)
ALP SERPL-CCNC: 62 U/L (ref 45–120)
ALT SERPL W P-5'-P-CCNC: 42 U/L (ref 0–45)
ANION GAP SERPL CALCULATED.3IONS-SCNC: 10 MMOL/L (ref 5–18)
AST SERPL W P-5'-P-CCNC: 26 U/L (ref 0–40)
BILIRUB SERPL-MCNC: 1.5 MG/DL (ref 0–1)
BUN SERPL-MCNC: 16 MG/DL (ref 8–22)
CALCIUM SERPL-MCNC: 9.9 MG/DL (ref 8.5–10.5)
CHLORIDE BLD-SCNC: 108 MMOL/L (ref 98–107)
CO2 SERPL-SCNC: 23 MMOL/L (ref 22–31)
CREAT SERPL-MCNC: 1.05 MG/DL (ref 0.7–1.3)
ERYTHROCYTE [DISTWIDTH] IN BLOOD BY AUTOMATED COUNT: 11.7 % (ref 10–15)
GFR SERPL CREATININE-BSD FRML MDRD: >90 ML/MIN/1.73M2
GLUCOSE BLD-MCNC: 80 MG/DL (ref 70–125)
HCT VFR BLD AUTO: 48.1 % (ref 40–53)
HGB BLD-MCNC: 17 G/DL (ref 13.3–17.7)
LEVETIRACETAM (KEPPRA): 15.8 UG/ML (ref 6–46)
MCH RBC QN AUTO: 29.9 PG (ref 26.5–33)
MCHC RBC AUTO-ENTMCNC: 35.3 G/DL (ref 31.5–36.5)
MCV RBC AUTO: 85 FL (ref 78–100)
PLATELET # BLD AUTO: 201 10E3/UL (ref 150–450)
POTASSIUM BLD-SCNC: 4.3 MMOL/L (ref 3.5–5)
PROT SERPL-MCNC: 7.2 G/DL (ref 6–8)
RBC # BLD AUTO: 5.69 10E6/UL (ref 4.4–5.9)
SODIUM SERPL-SCNC: 141 MMOL/L (ref 136–145)
WBC # BLD AUTO: 6.7 10E3/UL (ref 4–11)

## 2021-11-18 PROCEDURE — 80053 COMPREHEN METABOLIC PANEL: CPT

## 2021-11-18 PROCEDURE — 99000 SPECIMEN HANDLING OFFICE-LAB: CPT

## 2021-11-18 PROCEDURE — 85027 COMPLETE CBC AUTOMATED: CPT

## 2021-11-18 PROCEDURE — 80235 DRUG ASSAY LACOSAMIDE: CPT | Mod: 90

## 2021-11-18 PROCEDURE — 80177 DRUG SCRN QUAN LEVETIRACETAM: CPT

## 2021-11-18 PROCEDURE — 36415 COLL VENOUS BLD VENIPUNCTURE: CPT

## 2021-11-19 LAB — LACOSAMIDE SERPL-MCNC: 7.4 UG/ML

## 2021-11-29 ENCOUNTER — TELEPHONE (OUTPATIENT)
Dept: FAMILY MEDICINE | Facility: CLINIC | Age: 31
End: 2021-11-29
Payer: COMMERCIAL

## 2021-11-29 NOTE — TELEPHONE ENCOUNTER
Patient was requesting that we send his most recent labs(11/18)  to [facility]    fax # [phone].    Faxed on 11/29

## 2021-12-27 ENCOUNTER — APPOINTMENT (OUTPATIENT)
Dept: CT IMAGING | Facility: CLINIC | Age: 31
End: 2021-12-27
Attending: EMERGENCY MEDICINE
Payer: COMMERCIAL

## 2021-12-27 ENCOUNTER — HOSPITAL ENCOUNTER (EMERGENCY)
Facility: CLINIC | Age: 31
Discharge: HOME | End: 2021-12-27
Attending: EMERGENCY MEDICINE | Admitting: EMERGENCY MEDICINE
Payer: COMMERCIAL

## 2021-12-27 VITALS
RESPIRATION RATE: 18 BRPM | OXYGEN SATURATION: 96 % | SYSTOLIC BLOOD PRESSURE: 129 MMHG | WEIGHT: 230 LBS | TEMPERATURE: 98 F | BODY MASS INDEX: 36.02 KG/M2 | DIASTOLIC BLOOD PRESSURE: 81 MMHG | HEART RATE: 90 BPM

## 2021-12-27 DIAGNOSIS — G40.919 BREAKTHROUGH SEIZURE (H): ICD-10-CM

## 2021-12-27 PROCEDURE — 70450 CT HEAD/BRAIN W/O DYE: CPT

## 2021-12-27 PROCEDURE — 99284 EMERGENCY DEPT VISIT MOD MDM: CPT | Mod: 25

## 2021-12-27 NOTE — ED PROVIDER NOTES
EMERGENCY DEPARTMENT ENCOUNTER      NAME: Phoenix Win  AGE: 31 year old male  YOB: 1990  MRN: [id number]  EVALUATION DATE & TIME: No admission date for patient encounter.    PCP: Navin Kinney    ED PROVIDER: Marquita Zavala M.D.      Chief Complaint   Patient presents with     Seizures         FINAL IMPRESSION:  1. Breakthrough seizure (H)          ED COURSE & MEDICAL DECISION MAKING:    ED Course as of 12/27/21 1707   Mon Dec 27, 2021   1702 CT brain reassuringly no acute pathology, patient eager for dc to home with close f/u re: labs with his neurology team which is reassuring, no seizure events here with 3.5h observation and patient neurovascularly intact. Patient discharged after being provided with extensive anticipatory guidance and given return precautions, importance of PMD follow-up emphasized.        3:07 PM I met with the patient, obtained history, performed an initial exam, and discussed options and plan for diagnostics and treatment here in the ED.    Pertinent Labs & Imaging studies reviewed. (See chart for details)    N95 worn  A face shield was worn also  COVID PPE      At the conclusion of the encounter I discussed the results of all of the tests and the disposition. The questions were answered. The patient or family acknowledged understanding and was agreeable with the care plan.     MEDICATIONS GIVEN IN THE EMERGENCY:  Medications - No data to display    NEW PRESCRIPTIONS STARTED AT TODAY'S ER VISIT  New Prescriptions    No medications on file          =================================================================    HPI      Phoenix Win is a 31 year old male with PMHx of seizures who presents to the ED today with increased seizures.    The patient reports that he tripped and hit his head on a root November 28th (1 month ago) while running with his dog. He lost consciousness but had no symptoms afterwards and so did not come in. However, since this head injury the  patient's seizures have increased from twice per week to once or twice per day. He gets an aura and is dog partially trained to be a service animal and so he is able to lie down before his seizures and has not had a new head injury since. The patient loses consciousness during his seizures but regains it in between. The character of his seizures has not changed in the last month. The patient takes Keppra and has not missed any doses. His last blood check was mid November. The patient follows with Dr. Levi of [facility]. He called the group but cannot get in to see them for three months. The patient presents today to get the imaging his neurologist wants and to make sure everything else is okay. He denies fever, fatigue, cough, cold symptoms, chest pain, shortness of breath, abdominal pain, infectious symptoms, or other medical complaints.      REVIEW OF SYSTEMS   All other systems reviewed and are negative except as noted above in HPI.    PAST MEDICAL HISTORY:  Past Medical History:   Diagnosis Date     Seizure (H)        PAST SURGICAL HISTORY:  No past surgical history on file.    CURRENT MEDICATIONS:    BRIVIACT 100 mg Tab tablet  calcium-vitamin D 500 mg(1,250mg) -200 unit per tablet  ibuprofen (ADVIL,MOTRIN) 200 MG tablet  lacosamide (VIMPAT) 100 mg Tab  pregabalin (LYRICA) 150 MG capsule        ALLERGIES:  Allergies   Allergen Reactions     Penicillins Unknown     Amoxicillin Rash       FAMILY HISTORY:  No family history on file.    SOCIAL HISTORY:   Social History     Socioeconomic History     Marital status: Single     Spouse name: Not on file     Number of children: Not on file     Years of education: Not on file     Highest education level: Not on file   Occupational History     Not on file   Tobacco Use     Smoking status: Never Smoker     Smokeless tobacco: Never Used   Substance and Sexual Activity     Alcohol use: No     Drug use: No     Sexual activity: Not Currently   Other Topics  Concern     Not on file   Social History Narrative     Not on file     Social Determinants of Health     Financial Resource Strain: Not on file   Food Insecurity: Not on file   Transportation Needs: Not on file   Physical Activity: Not on file   Stress: Not on file   Social Connections: Not on file   Intimate Partner Violence: Not on file   Housing Stability: Not on file       VITALS:  Patient Vitals for the past 24 hrs:   BP Temp Temp src Pulse Resp SpO2 Weight   12/27/21 1440 (!) 141/101 98  F (36.7  C) Temporal 90 18 98 % 104.3 kg (230 lb)       PHYSICAL EXAM    GENERAL: Awake, alert.  In no acute distress.   HEENT: Normocephalic, atraumatic.  Pupils equal, round and reactive.  Conjunctiva normal.  EOMI.  NECK: No stridor or apparent deformity.  PULMONARY: Symmetrical breath sounds without distress.  Lungs clear to auscultation bilaterally without wheezes, rhonchi or rales.  CARDIO: Regular rate and rhythm.  No significant murmur, rub or gallop.  Radial pulses strong and symmetrical.  ABDOMINAL: Abdomen soft, non-distended and non-tender to palpation.  No CVAT, no palpable hepatosplenomegaly.  EXTREMITIES: No lower extremity swelling or edema.    NEURO: Alert and oriented to person, place and time.  Cranial nerves grossly intact.  No focal motor deficit.  PSYCH: Normal mood and affect  SKIN: No rashes      LAB:  All pertinent labs reviewed and interpreted.  Results for orders placed or performed during the hospital encounter of 12/27/21   Head CT w/o contrast    Impression    IMPRESSION:  1.  No acute intracranial abnormality.  2.  Encephalomalacia involving the inferolateral left temporal lobe.       RADIOLOGY:  Reviewed all pertinent imaging. Please see official radiology report.  Head CT w/o contrast   Final Result   IMPRESSION:   1.  No acute intracranial abnormality.   2.  Encephalomalacia involving the inferolateral left temporal lobe.                    Mary Lou IBARRA, am serving as a scribe to  document services personally performed by Dr. Marquita Zavala based on my observation and the provider's statements to me. I, Marquita Zavala MD attest that Mary Lou Disla is acting in a scribe capacity, has observed my performance of the services and has documented them in accordance with my direction.     Marquita Zavala MD  12/27/21 0758

## 2021-12-27 NOTE — ED NOTES
Patient here with a hx of epilepsy w/ seizure occurrence 1x/ week, sustained head injury 1 month ago and has had an increase in seizure activity since. Patient A& Ox3  Upon discharge teaching. No signs or symptoms of seizure of postictal state. Patient reports close relationship with neuro team and plans to follow up asap.

## 2021-12-27 NOTE — ED TRIAGE NOTES
Patient reports a hx of seizures and is treated fro epilepsy, unable to get into neuro for 3 months and comes in for eval after hitting head on Nov 28 which has increased his seizures from 2-3/wk to 1-2 clusters/day.  Lelo Mansfield RN.......12/27/2021 2:40 PM

## 2022-01-05 ENCOUNTER — APPOINTMENT (OUTPATIENT)
Dept: RADIOLOGY | Facility: CLINIC | Age: 32
End: 2022-01-05
Attending: EMERGENCY MEDICINE
Payer: COMMERCIAL

## 2022-01-05 ENCOUNTER — HOSPITAL ENCOUNTER (EMERGENCY)
Facility: CLINIC | Age: 32
Discharge: HOME | End: 2022-01-05
Attending: EMERGENCY MEDICINE | Admitting: EMERGENCY MEDICINE
Payer: COMMERCIAL

## 2022-01-05 VITALS
SYSTOLIC BLOOD PRESSURE: 133 MMHG | HEART RATE: 105 BPM | OXYGEN SATURATION: 96 % | BODY MASS INDEX: 36.1 KG/M2 | WEIGHT: 230 LBS | DIASTOLIC BLOOD PRESSURE: 73 MMHG | RESPIRATION RATE: 16 BRPM | HEIGHT: 67 IN | TEMPERATURE: 98.8 F

## 2022-01-05 DIAGNOSIS — U07.1 INFECTION DUE TO 2019 NOVEL CORONAVIRUS: ICD-10-CM

## 2022-01-05 DIAGNOSIS — G40.909 NONINTRACTABLE EPILEPSY WITHOUT STATUS EPILEPTICUS, UNSPECIFIED EPILEPSY TYPE (H): ICD-10-CM

## 2022-01-05 LAB
ALBUMIN SERPL-MCNC: 4.6 G/DL (ref 3.5–5)
ALP SERPL-CCNC: 59 U/L (ref 45–120)
ALT SERPL W P-5'-P-CCNC: 79 U/L (ref 0–45)
ANION GAP SERPL CALCULATED.3IONS-SCNC: 14 MMOL/L (ref 5–18)
AST SERPL W P-5'-P-CCNC: 39 U/L (ref 0–40)
ATRIAL RATE - MUSE: 110 BPM
BASOPHILS # BLD AUTO: 0 10E3/UL (ref 0–0.2)
BASOPHILS NFR BLD AUTO: 1 %
BILIRUB SERPL-MCNC: 1.2 MG/DL (ref 0–1)
BUN SERPL-MCNC: 11 MG/DL (ref 8–22)
CALCIUM SERPL-MCNC: 9 MG/DL (ref 8.5–10.5)
CHLORIDE BLD-SCNC: 104 MMOL/L (ref 98–107)
CK SERPL-CCNC: 104 U/L (ref 30–190)
CO2 SERPL-SCNC: 24 MMOL/L (ref 22–31)
CREAT SERPL-MCNC: 1.02 MG/DL (ref 0.7–1.3)
D DIMER PPP FEU-MCNC: <=0.27 UG/ML FEU (ref 0–0.5)
DIASTOLIC BLOOD PRESSURE - MUSE: NORMAL MMHG
EOSINOPHIL # BLD AUTO: 0 10E3/UL (ref 0–0.7)
EOSINOPHIL NFR BLD AUTO: 0 %
ERYTHROCYTE [DISTWIDTH] IN BLOOD BY AUTOMATED COUNT: 12 % (ref 10–15)
FLUAV RNA SPEC QL NAA+PROBE: NEGATIVE
FLUBV RNA RESP QL NAA+PROBE: NEGATIVE
GFR SERPL CREATININE-BSD FRML MDRD: >90 ML/MIN/1.73M2
GLUCOSE BLD-MCNC: 110 MG/DL (ref 70–125)
HCT VFR BLD AUTO: 46.6 % (ref 40–53)
HGB BLD-MCNC: 16.2 G/DL (ref 13.3–17.7)
IMM GRANULOCYTES # BLD: 0 10E3/UL
IMM GRANULOCYTES NFR BLD: 0 %
INTERPRETATION ECG - MUSE: NORMAL
LEVETIRACETAM (KEPPRA): 21.4 UG/ML (ref 6–46)
LYMPHOCYTES # BLD AUTO: 0.8 10E3/UL (ref 0.8–5.3)
LYMPHOCYTES NFR BLD AUTO: 9 %
MAGNESIUM SERPL-MCNC: 1.8 MG/DL (ref 1.8–2.6)
MCH RBC QN AUTO: 29.6 PG (ref 26.5–33)
MCHC RBC AUTO-ENTMCNC: 34.8 G/DL (ref 31.5–36.5)
MCV RBC AUTO: 85 FL (ref 78–100)
MONOCYTES # BLD AUTO: 1.1 10E3/UL (ref 0–1.3)
MONOCYTES NFR BLD AUTO: 13 %
NEUTROPHILS # BLD AUTO: 6.9 10E3/UL (ref 1.6–8.3)
NEUTROPHILS NFR BLD AUTO: 77 %
NRBC # BLD AUTO: 0 10E3/UL
NRBC BLD AUTO-RTO: 0 /100
P AXIS - MUSE: 55 DEGREES
PLATELET # BLD AUTO: 170 10E3/UL (ref 150–450)
POTASSIUM BLD-SCNC: 3.7 MMOL/L (ref 3.5–5)
PR INTERVAL - MUSE: 138 MS
PROT SERPL-MCNC: 7.5 G/DL (ref 6–8)
QRS DURATION - MUSE: 88 MS
QT - MUSE: 312 MS
QTC - MUSE: 422 MS
R AXIS - MUSE: 74 DEGREES
RBC # BLD AUTO: 5.48 10E6/UL (ref 4.4–5.9)
SARS-COV-2 RNA RESP QL NAA+PROBE: POSITIVE
SODIUM SERPL-SCNC: 142 MMOL/L (ref 136–145)
SYSTOLIC BLOOD PRESSURE - MUSE: NORMAL MMHG
T AXIS - MUSE: -23 DEGREES
TROPONIN I SERPL-MCNC: 0.01 NG/ML (ref 0–0.29)
VENTRICULAR RATE- MUSE: 110 BPM
WBC # BLD AUTO: 8.9 10E3/UL (ref 4–11)

## 2022-01-05 PROCEDURE — 71045 X-RAY EXAM CHEST 1 VIEW: CPT

## 2022-01-05 PROCEDURE — 85025 COMPLETE CBC W/AUTO DIFF WBC: CPT | Performed by: EMERGENCY MEDICINE

## 2022-01-05 PROCEDURE — 82550 ASSAY OF CK (CPK): CPT | Performed by: EMERGENCY MEDICINE

## 2022-01-05 PROCEDURE — 82040 ASSAY OF SERUM ALBUMIN: CPT | Performed by: EMERGENCY MEDICINE

## 2022-01-05 PROCEDURE — 99285 EMERGENCY DEPT VISIT HI MDM: CPT | Mod: 25

## 2022-01-05 PROCEDURE — 93005 ELECTROCARDIOGRAM TRACING: CPT | Performed by: EMERGENCY MEDICINE

## 2022-01-05 PROCEDURE — 250N000011 HC RX IP 250 OP 636: Performed by: EMERGENCY MEDICINE

## 2022-01-05 PROCEDURE — 85379 FIBRIN DEGRADATION QUANT: CPT | Performed by: EMERGENCY MEDICINE

## 2022-01-05 PROCEDURE — C9803 HOPD COVID-19 SPEC COLLECT: HCPCS

## 2022-01-05 PROCEDURE — 96374 THER/PROPH/DIAG INJ IV PUSH: CPT

## 2022-01-05 PROCEDURE — 36415 COLL VENOUS BLD VENIPUNCTURE: CPT | Performed by: EMERGENCY MEDICINE

## 2022-01-05 PROCEDURE — 80053 COMPREHEN METABOLIC PANEL: CPT | Performed by: EMERGENCY MEDICINE

## 2022-01-05 PROCEDURE — 87636 SARSCOV2 & INF A&B AMP PRB: CPT | Performed by: EMERGENCY MEDICINE

## 2022-01-05 PROCEDURE — 83735 ASSAY OF MAGNESIUM: CPT | Performed by: EMERGENCY MEDICINE

## 2022-01-05 PROCEDURE — 80177 DRUG SCRN QUAN LEVETIRACETAM: CPT | Performed by: EMERGENCY MEDICINE

## 2022-01-05 PROCEDURE — 250N000013 HC RX MED GY IP 250 OP 250 PS 637: Performed by: EMERGENCY MEDICINE

## 2022-01-05 PROCEDURE — 84484 ASSAY OF TROPONIN QUANT: CPT | Performed by: EMERGENCY MEDICINE

## 2022-01-05 PROCEDURE — 80235 DRUG ASSAY LACOSAMIDE: CPT | Performed by: EMERGENCY MEDICINE

## 2022-01-05 RX ORDER — LORAZEPAM 2 MG/ML
2 INJECTION INTRAMUSCULAR ONCE
Status: COMPLETED | OUTPATIENT
Start: 2022-01-05 | End: 2022-01-05

## 2022-01-05 RX ORDER — ACETAMINOPHEN 325 MG/1
975 TABLET ORAL ONCE
Status: COMPLETED | OUTPATIENT
Start: 2022-01-05 | End: 2022-01-05

## 2022-01-05 RX ADMIN — LORAZEPAM 2 MG: 2 INJECTION INTRAMUSCULAR; INTRAVENOUS at 20:14

## 2022-01-05 RX ADMIN — ACETAMINOPHEN 975 MG: 325 TABLET ORAL at 20:16

## 2022-01-05 ASSESSMENT — ENCOUNTER SYMPTOMS
SORE THROAT: 1
WEAKNESS: 0
SHORTNESS OF BREATH: 1
CARDIOVASCULAR NEGATIVE: 1
FEVER: 1
MUSCULOSKELETAL NEGATIVE: 1
GASTROINTESTINAL NEGATIVE: 1
CHILLS: 1
HEADACHES: 0
COUGH: 1
TROUBLE SWALLOWING: 0
NUMBNESS: 0

## 2022-01-05 ASSESSMENT — MIFFLIN-ST. JEOR: SCORE: 1946.9

## 2022-01-05 NOTE — ED TRIAGE NOTES
Patient has a history of Epilepsy with breakthrough seizures, fever, temp max 103.0, cough, loss of taste and smell, sob, x2 days other family members in house tested positive for covid, he tested negative on Tuesday.  Taking Tylenol and Ibuprofen.  Takes liquid Lorazepam for partial onset GTC's, had 3 cluster seizures today.

## 2022-01-05 NOTE — ED PROVIDER NOTES
EMERGENCY DEPARTMENT ENCOUNTER      NAME: Phoenix Win  AGE: 31 year old male  YOB: 1990  MRN: [id number]  EVALUATION DATE & TIME: 1/5/2022  5:34 PM    PCP: Navin Kinney    ED PROVIDER: Moy Smith MD      Chief Complaint   Patient presents with     Seizures     Covid 19 Testing     FINAL IMPRESSION:  1. Infection due to 2019 novel coronavirus    2. Nonintractable epilepsy without status epilepticus, unspecified epilepsy type (H)      ED COURSE & MEDICAL DECISION MAKING:    Pertinent Labs & Imaging studies reviewed. (See chart for details)  31 year old male presents to the Emergency Department for evaluation of exposure and symptoms concerning for COVID-19 viral infection made more complicated by epilepsy and breakthrough seizures.  Patient daily has seizures that are partial in nature maintained on 3 epileptic medications.  Partial seizures involving upper extremity and sometimes his speech.  4 episodes today.  On clinical examination patient was well-appearing.  Mild elevation to his blood pressure and heart rate but otherwise no concerning exam findings his pulmonary termination was normal to auscultation.  Recommended screen laboratory testing with plan for imaging of his chest.  Seizure medication levels have been sent off and are pending at this time.  Patient took antipyretic prior to arrival in the emergency department and fever is currently within normal limits.  Likely patient is suffering from acute COVID-19 viral infection that has caused an escalation to his epileptic consistent with breakthrough seizures.  Will reassess after return of his test results.    10:02 PM  Overall patient was improved over the course of his stay in the emergency department.  He did have one episode brief partial seizure typical of his seizures.  Recovered quickly.  Did receive a dose of Ativan in the emergency department for this.  He is well versed in his epilepsy with a good plan of care in  place at home.  I contacted and discussed the case with Dr. Levi his epileptic physician.  The levels are pending at this time.  Those have been sent out.  Dr. Levi recommended adding on 500 additional milligrams of Keppra in the midday to help control his seizures during this period of COVID-19 infection which no doubt is causing an escalation to his epileptic condition.  In addition have arranged for follow-up tomorrow with the epileptic group to address any additional considerations regarding his medication management once those levels return.  Patient is comfortable at discharge.  I discussed with him appropriate return precautions regarding both his epilepsy and the COVID-19 and we discussed appropriate management and follow-up.    At the conclusion of the encounter I discussed the results of all of the tests and the disposition. The questions were answered. The patient or family acknowledged understanding and was agreeable with the care plan.     MEDICATIONS GIVEN IN THE EMERGENCY:  Medications   acetaminophen (TYLENOL) tablet 975 mg (975 mg Oral Given 1/5/22 2016)   LORazepam (ATIVAN) injection 2 mg (2 mg Intravenous Given 1/5/22 2014)       NEW PRESCRIPTIONS STARTED AT TODAY'S ER VISIT  [unfilled]       =================================================================    HPI    Patient information was obtained from: Patient      Phoenix Win is a 31 year old male with a pertinent history of longstanding history of epilepsy on multiple antiepileptic medications presented to the emergency department with symptoms concerning for COVID-19 infection and recurrent seizures.  Patient reporting that he is followed by Dr. Levi of the [place] [facility] group.  He is on 3 different seizure medications Briviact Vimpat and Keppra.  When he is off of his medications he has generalized tonic-clonic seizures.  When he is on his medications he typically has at least one seizure a day that is a  partial seizure usually involving his upper extremities sometimes his speech.  He has had 4 of those today.  Patient reporting that they are occurring more frequently when his temperature is elevated.  He had temperatures as high as 103.  Reports loss of taste cough sore throat general malaise body aches fever fatigue.  For members at home with Covid positive diagnoses.  Patient is vaccinated with a [employer].  He feels mildly short of breath no chest pain.  No injury.  Patient denies additional symptoms.    REVIEW OF SYSTEMS   Review of Systems   Constitutional: Positive for chills and fever.   HENT: Positive for congestion and sore throat. Negative for trouble swallowing.    Respiratory: Positive for cough and shortness of breath.    Cardiovascular: Negative.    Gastrointestinal: Negative.    Genitourinary: Negative.    Musculoskeletal: Negative.    Neurological: Negative for weakness, numbness and headaches.   All other systems reviewed and are negative.     PAST MEDICAL HISTORY:  Past Medical History:   Diagnosis Date     Seizure (H)      PAST SURGICAL HISTORY:  No past surgical history on file.    CURRENT MEDICATIONS:    BRIVIACT 100 mg Tab tablet  calcium-vitamin D 500 mg(1,250mg) -200 unit per tablet  ibuprofen (ADVIL,MOTRIN) 200 MG tablet  lacosamide (VIMPAT) 100 mg Tab  pregabalin (LYRICA) 150 MG capsule      ALLERGIES:  Allergies   Allergen Reactions     Penicillins Unknown     Amoxicillin Rash       FAMILY HISTORY:  No family history on file.    SOCIAL HISTORY:   Social History     Socioeconomic History     Marital status: Single     Spouse name: Not on file     Number of children: Not on file     Years of education: Not on file     Highest education level: Not on file   Occupational History     Not on file   Tobacco Use     Smoking status: Never Smoker     Smokeless tobacco: Never Used   Substance and Sexual Activity     Alcohol use: No     Drug use: No     Sexual activity: Not Currently   Other  "Topics Concern     Not on file   Social History Narrative     Not on file     Social Determinants of Health     Financial Resource Strain: Not on file   Food Insecurity: Not on file   Transportation Needs: Not on file   Physical Activity: Not on file   Stress: Not on file   Social Connections: Not on file   Intimate Partner Violence: Not on file   Housing Stability: Not on file       VITALS:  /73   Pulse 105   Temp 98.8  F (37.1  C) (Oral)   Resp 16   Ht 1.702 m (5' 7\")   Wt 104.3 kg (230 lb)   SpO2 96%   BMI 36.02 kg/m      PHYSICAL EXAM    Constitutional: Well developed, Well nourished, NAD  HENT: Normocephalic, Atraumatic, Bilateral external ears normal, Oropharynx normal, mucous membranes moist, Nose normal. Neck-  Normal range of motion, No tenderness, Supple, No stridor.   Eyes: PERRL, EOMI, Conjunctiva normal, No discharge.   Respiratory: Normal breath sounds, No respiratory distress, No wheezing, Speaks full sentences easily. No cough.   Cardiovascular: tachycardia, Regular rhythm, No murmurs Chest wall nontender.    GI:  Soft, No tenderness, No masses, No flank tenderness. No rebound or guarding.  : No cva tenderness    Musculoskeletal: 2+ DP pulses. No edema. No cyanosis. Good range of motion in all major joints. No tenderness to palpation. No tenderness of the CTLS spine.   Integument: Warm, Dry, No erythema, No rash. No petechiae.   Neurologic: Alert & oriented x 3, Normal motor function, Normal sensory function, No focal deficits noted.   Psychiatric: Affect normal, Judgment normal, Mood normal. Cooperative.      LAB:  All pertinent labs reviewed and interpreted.  Results for orders placed or performed during the hospital encounter of 01/05/22   XR Chest Port 1 View    Impression    IMPRESSION: Negative chest.   Symptomatic; Unknown Influenza A/B & SARS-CoV2 (COVID-19) Virus PCR Multiplex Nasopharyngeal    Specimen: Nasopharyngeal; Swab   Result Value Ref Range    Influenza A PCR Negative " Negative    Influenza B PCR Negative Negative    SARS CoV2 PCR Positive (A) Negative   Comprehensive metabolic panel   Result Value Ref Range    Sodium 142 136 - 145 mmol/L    Potassium 3.7 3.5 - 5.0 mmol/L    Chloride 104 98 - 107 mmol/L    Carbon Dioxide (CO2) 24 22 - 31 mmol/L    Anion Gap 14 5 - 18 mmol/L    Urea Nitrogen 11 8 - 22 mg/dL    Creatinine 1.02 0.70 - 1.30 mg/dL    Calcium 9.0 8.5 - 10.5 mg/dL    Glucose 110 70 - 125 mg/dL    Alkaline Phosphatase 59 45 - 120 U/L    AST 39 0 - 40 U/L    ALT 79 (H) 0 - 45 U/L    Protein Total 7.5 6.0 - 8.0 g/dL    Albumin 4.6 3.5 - 5.0 g/dL    Bilirubin Total 1.2 (H) 0.0 - 1.0 mg/dL    GFR Estimate >90 >60 mL/min/1.73m2   Result Value Ref Range    Magnesium 1.8 1.8 - 2.6 mg/dL   Result Value Ref Range     30 - 190 U/L   Keppra (Levetiracetam) Level   Result Value Ref Range    Levetiracetam 21.4 6.0 - 46.0 ug/mL   Troponin I (now)   Result Value Ref Range    Troponin I 0.01 0.00 - 0.29 ng/mL   D dimer quantitative   Result Value Ref Range    D-Dimer Quantitative <=0.27 0.00 - 0.50 ug/mL FEU   CBC with platelets and differential   Result Value Ref Range    WBC Count 8.9 4.0 - 11.0 10e3/uL    RBC Count 5.48 4.40 - 5.90 10e6/uL    Hemoglobin 16.2 13.3 - 17.7 g/dL    Hematocrit 46.6 40.0 - 53.0 %    MCV 85 78 - 100 fL    MCH 29.6 26.5 - 33.0 pg    MCHC 34.8 31.5 - 36.5 g/dL    RDW 12.0 10.0 - 15.0 %    Platelet Count 170 150 - 450 10e3/uL    % Neutrophils 77 %    % Lymphocytes 9 %    % Monocytes 13 %    % Eosinophils 0 %    % Basophils 1 %    % Immature Granulocytes 0 %    NRBCs per 100 WBC 0 <1 /100    Absolute Neutrophils 6.9 1.6 - 8.3 10e3/uL    Absolute Lymphocytes 0.8 0.8 - 5.3 10e3/uL    Absolute Monocytes 1.1 0.0 - 1.3 10e3/uL    Absolute Eosinophils 0.0 0.0 - 0.7 10e3/uL    Absolute Basophils 0.0 0.0 - 0.2 10e3/uL    Absolute Immature Granulocytes 0.0 <=0.4 10e3/uL    Absolute NRBCs 0.0 10e3/uL   ECG 12-LEAD WITH MUSE (LHE)   Result Value Ref Range    Systolic  Blood Pressure  mmHg    Diastolic Blood Pressure  mmHg    Ventricular Rate 110 BPM    Atrial Rate 110 BPM    UT Interval 138 ms    QRS Duration 88 ms     ms    QTc 422 ms    P Axis 55 degrees    R AXIS 74 degrees    T Axis -23 degrees    Interpretation ECG       Sinus tachycardia  Cannot rule out Inferior infarct , age undetermined  Abnormal ECG  When compared with ECG of 07-JAN-2021 02:52,  Vent. rate has increased BY  39 BPM  T wave inversion more evident in Inferior leads  Confirmed by SEE ED PROVIDER NOTE FOR, ECG INTERPRETATION ([id number]),  RAÚL FISHER ([id number]) on 1/5/2022 7:00:59 PM         RADIOLOGY:  Reviewed all pertinent imaging. Please see official radiology report.  XR Chest Port 1 View   Final Result   IMPRESSION: Negative chest.          EKG:    Performed at: 1711  Sinus tachycardia  Heart rate 110  ST segments appear nonischemic  No ectopy  Intervals normal  Comparison to ECG from should be January 7, 2021 sinus tachycardia has developed  Clinical impression: Sinus tachycardia no other acute changes appreciated    I have independently reviewed and interpreted the EKG(s) documented above.      Moy Smith MD  Emergency Medicine  [facility] EMERGENCY ROOM  [address] [id number] [phone]       Moy Smith MD  01/05/22 [id number]

## 2022-01-06 NOTE — DISCHARGE INSTRUCTIONS
A discussion was held with your epileptic doctor.  They are going to follow-up with you tomorrow morning.  The request was to add on 500 mg of Keppra in the afternoon so you should be taking 1500 of Keppra in the morning and 500 in the afternoon and 1500 in the evening.  Continue other seizure medication and your breakthrough medications as already prescribed.  Rest and fluids.  Tylenol and ibuprofen to keep your fever down will help prevent recurrent seizures.  They are going to follow-up with you tomorrow morning expect phone call for further discussion and management of your medications.  Return to your nearest emergency department if you have any escalation your symptoms or develop additional concern.

## 2022-01-07 ENCOUNTER — NURSE TRIAGE (OUTPATIENT)
Dept: NURSING | Facility: CLINIC | Age: 32
End: 2022-01-07

## 2022-01-07 ENCOUNTER — VIRTUAL VISIT (OUTPATIENT)
Dept: URGENT CARE | Facility: CLINIC | Age: 32
End: 2022-01-07
Payer: COMMERCIAL

## 2022-01-07 DIAGNOSIS — U07.1 INFECTION DUE TO 2019 NOVEL CORONAVIRUS: ICD-10-CM

## 2022-01-07 DIAGNOSIS — J02.9 PHARYNGITIS, UNSPECIFIED ETIOLOGY: Primary | ICD-10-CM

## 2022-01-07 DIAGNOSIS — H92.03 OTALGIA OF BOTH EARS: ICD-10-CM

## 2022-01-07 PROCEDURE — 99214 OFFICE O/P EST MOD 30 MIN: CPT | Mod: 95 | Performed by: PHYSICIAN ASSISTANT

## 2022-01-07 RX ORDER — AZITHROMYCIN 250 MG/1
TABLET, FILM COATED ORAL
Qty: 6 TABLET | Refills: 0 | Status: SHIPPED | OUTPATIENT
Start: 2022-01-07 | End: 2022-12-11

## 2022-01-07 RX ORDER — PREDNISONE 20 MG/1
40 TABLET ORAL DAILY
Qty: 10 TABLET | Refills: 0 | Status: SHIPPED | OUTPATIENT
Start: 2022-01-07 | End: 2022-01-12

## 2022-01-07 NOTE — TELEPHONE ENCOUNTER
Patient's mother calling in for patient who has a severe sore throat and ear pain. Patient did test positive for Covid on 1/5/21. Has been having symptoms since 1/3/21.    Patient also has a fever T-100, nasal congestion, and fatigue.  Rates ear pain 5-6/10. Taking Tylenol for relief.    Per protocol, recommendations are for patient to See Today or Tomorrow in office. Advised virtual visit since patient is Covid positive.  Care advice given. Mother/Patient verbalizes understanding and agrees with plan of care. Transferred to scheduling.    Shy Del Rio RN  01/07/22 12:51 PM  [facility] Nurse Advisor      Reason for Disposition    All other earaches (Exceptions: earache lasting < 1 hour, and earache from air travel)    Additional Information    Negative: Sounds like a life-threatening emergency to the triager    Negative: Moving the earlobe or touching the ear clearly increases the pain    Negative: Pink or red swelling behind the ear    Negative: Stiff neck (can't touch chin to chest)    Negative: Patient sounds very sick or weak to the triager    Negative: Severe earache pain    Negative: Fever > 103 F (39.4 C)    Negative: Pointed object was inserted into the ear canal (e.g., a pencil, stick, or wire)    Negative: White, yellow, or green discharge    Negative: Diabetes mellitus or a weak immune system (e.g., HIV positive, cancer chemotherapy, transplant patient)    Negative: Bloody discharge or unexplained bleeding from ear canal    Negative: New blurred vision or vision changes    Protocols used: EARACHE-A-[place]    COVID 19 Nurse Triage Plan/Patient Instructions    Please be aware that novel coronavirus (COVID-19) may be circulating in the community. If you develop symptoms such as fever, cough, or SOB or if you have concerns about the presence of another infection including coronavirus (COVID-19), please contact your health care provider or visit [email or web address].      Disposition/Instructions    Virtual Visit with provider recommended. Reference Visit Selection Guide.    Thank you for taking steps to prevent the spread of this virus.  o Limit your contact with others.  o Wear a simple mask to cover your cough.  o Wash your hands well and often.    Resources    [facility]: About COVID-19: [email or web address]    CDC: What to Do If You're Sick: www.cdc.gov/coronavirus/2019-ncov/about/steps-when-sick.html    CDC: Ending Home Isolation: www.cdc.gov/coronavirus/2019-ncov/hcp/disposition-in-home-patients.html     CDC: Caring for Someone: www.cdc.gov/coronavirus/2019-ncov/if-you-are-sick/care-for-someone.html     [facility]: Interim Guidance for Hospital Discharge to Home: www.[facility]/diseases/coronavirus/hcp/hospdischarge.pdf    [facility] clinical trials (COVID-19 research studies): clinicalaffairs.[facility]-clinical-trials     Below are the COVID-19 hotlines at the [place] Department [facility] ([facility]). Interpreters are available.   o For health questions: Call 098-857-8447 or 1-737.868.3361 (7 a.m. to 7 p.m.)  o For questions about schools and childcare: Call 416-481-5903 or 1-921.868.2231 (7 a.m. to 7 p.m.)

## 2022-01-08 LAB — LACOSAMIDE SERPL-MCNC: 10 UG/ML

## 2022-03-11 ENCOUNTER — MEDICAL CORRESPONDENCE (OUTPATIENT)
Dept: HEALTH INFORMATION MANAGEMENT | Facility: CLINIC | Age: 32
End: 2022-03-11
Payer: COMMERCIAL

## 2022-04-05 ENCOUNTER — MEDICAL CORRESPONDENCE (OUTPATIENT)
Dept: HEALTH INFORMATION MANAGEMENT | Facility: CLINIC | Age: 32
End: 2022-04-05
Payer: COMMERCIAL

## 2022-04-14 ENCOUNTER — LAB (OUTPATIENT)
Dept: LAB | Facility: CLINIC | Age: 32
End: 2022-04-14
Payer: COMMERCIAL

## 2022-04-14 DIAGNOSIS — G40.209 COMPLEX PARTIAL SEIZURES WITH CONSCIOUSNESS IMPAIRED (H): Primary | ICD-10-CM

## 2022-04-14 PROCEDURE — 99000 SPECIMEN HANDLING OFFICE-LAB: CPT

## 2022-04-14 PROCEDURE — 80177 DRUG SCRN QUAN LEVETIRACETAM: CPT | Mod: 90

## 2022-04-14 PROCEDURE — 80235 DRUG ASSAY LACOSAMIDE: CPT | Mod: 90

## 2022-04-14 PROCEDURE — 36415 COLL VENOUS BLD VENIPUNCTURE: CPT

## 2022-04-15 LAB — LEVETIRACETAM SERPL-MCNC: 30 UG/ML

## 2022-04-16 LAB — LACOSAMIDE SERPL-MCNC: 7.4 UG/ML

## 2022-05-14 ENCOUNTER — MEDICAL CORRESPONDENCE (OUTPATIENT)
Dept: HEALTH INFORMATION MANAGEMENT | Facility: CLINIC | Age: 32
End: 2022-05-14
Payer: COMMERCIAL

## 2022-05-16 ENCOUNTER — MEDICAL CORRESPONDENCE (OUTPATIENT)
Dept: HEALTH INFORMATION MANAGEMENT | Facility: CLINIC | Age: 32
End: 2022-05-16
Payer: COMMERCIAL

## 2022-06-12 ENCOUNTER — TRANSFERRED RECORDS (OUTPATIENT)
Dept: HEALTH INFORMATION MANAGEMENT | Facility: CLINIC | Age: 32
End: 2022-06-12

## 2022-06-21 ENCOUNTER — TRANSFERRED RECORDS (OUTPATIENT)
Dept: HEALTH INFORMATION MANAGEMENT | Facility: CLINIC | Age: 32
End: 2022-06-21

## 2022-09-29 ENCOUNTER — TRANSFERRED RECORDS (OUTPATIENT)
Dept: HEALTH INFORMATION MANAGEMENT | Facility: CLINIC | Age: 32
End: 2022-09-29

## 2022-09-29 LAB — PHQ9 SCORE: 3

## 2022-10-02 ENCOUNTER — HEALTH MAINTENANCE LETTER (OUTPATIENT)
Age: 32
End: 2022-10-02

## 2022-12-08 ENCOUNTER — TRANSFERRED RECORDS (OUTPATIENT)
Dept: HEALTH INFORMATION MANAGEMENT | Facility: CLINIC | Age: 32
End: 2022-12-08

## 2022-12-11 ENCOUNTER — OFFICE VISIT (OUTPATIENT)
Dept: FAMILY MEDICINE | Facility: CLINIC | Age: 32
End: 2022-12-11
Payer: COMMERCIAL

## 2022-12-11 VITALS
BODY MASS INDEX: 35.96 KG/M2 | HEART RATE: 104 BPM | SYSTOLIC BLOOD PRESSURE: 115 MMHG | DIASTOLIC BLOOD PRESSURE: 79 MMHG | OXYGEN SATURATION: 96 % | RESPIRATION RATE: 20 BRPM | WEIGHT: 229.6 LBS | TEMPERATURE: 100.4 F

## 2022-12-11 DIAGNOSIS — R50.9 FEVER, UNSPECIFIED FEVER CAUSE: Primary | ICD-10-CM

## 2022-12-11 LAB
FLUAV AG SPEC QL IA: NEGATIVE
FLUBV AG SPEC QL IA: NEGATIVE

## 2022-12-11 PROCEDURE — U0005 INFEC AGEN DETEC AMPLI PROBE: HCPCS

## 2022-12-11 PROCEDURE — 87804 INFLUENZA ASSAY W/OPTIC: CPT

## 2022-12-11 PROCEDURE — 99213 OFFICE O/P EST LOW 20 MIN: CPT | Mod: CS

## 2022-12-11 PROCEDURE — U0003 INFECTIOUS AGENT DETECTION BY NUCLEIC ACID (DNA OR RNA); SEVERE ACUTE RESPIRATORY SYNDROME CORONAVIRUS 2 (SARS-COV-2) (CORONAVIRUS DISEASE [COVID-19]), AMPLIFIED PROBE TECHNIQUE, MAKING USE OF HIGH THROUGHPUT TECHNOLOGIES AS DESCRIBED BY CMS-2020-01-R: HCPCS

## 2022-12-11 NOTE — PROGRESS NOTES
ASSESSMENT:  (R50.9) Fever, unspecified fever cause  (primary encounter diagnosis)  Plan: Influenza A & B Antigen - Clinic Collect,         Symptomatic COVID-19 Virus (Coronavirus) by PCR        Nose    PLAN:  Informed the patient that the influenza test is negative and the COVID test is pending.  Discussed that we will contact him within 1-2 business days if the COVID test is positive.  Instructed him to get plenty of rest, drink fluids and use Tylenol and ibuprofen as needed for pain and fever with a maximum dose of Tylenol being 4000 mg in a 24-hour period of time and to take ibuprofen with food to avoid an upset stomach.  We also discussed trying warm salt water gargles, hot/warm water or tea with honey and/or lemon and/or Cepacol lozenges or spray for his sore throat.  Discussed the need to return to clinic with any new or worsening symptoms.  Patient acknowledged his understanding of the above plan.    Josh Thurston, QIAN CNP      SUBJECTIVE:   Phoenix Win is a 32 year old male presenting with a chief complaint of fever, sore throat and cough - non-productive.  Onset of symptoms was 1 day ago.  Course of illness is same.    Patient denies: nausea, vomiting and diarrhea  Treatment measures tried include Ibuprofen.  Predisposing factors include ill contact: Family member .    Patient did not do an at home COVID test.     ROS:  Negative except noted above.     OBJECTIVE:  /79   Pulse 104   Temp 100.4  F (38  C) (Oral)   Resp 20   Wt 104.1 kg (229 lb 9.6 oz)   SpO2 96%   BMI 35.96 kg/m    GENERAL APPEARANCE: healthy, alert and no distress  EYES: EOMI,  PERRL, conjunctiva clear  HENT: ear canals and TM's normal.  Nose and mouth without ulcers, erythema or lesions  NECK: supple, nontender, no lymphadenopathy  RESP: lungs clear to auscultation - no rales, rhonchi or wheezes  CV: regular rates and rhythm, normal S1 S2, no murmur noted  NEURO: Normal strength and tone, sensory exam grossly  normal,  normal speech and mentation  SKIN: no suspicious lesions or rashes  PSYCH: mentation appears normal

## 2022-12-11 NOTE — PATIENT INSTRUCTIONS
Influenza test negative for influenza.  COVID test pending.  We will contact you within 1-2 business days if the COVID test is positive.  Get plenty of rest and drink fluids.  Can use Tylenol and/or ibuprofen as needed for pain and fever.  Maximum dose of Tylenol is 4000mg in a 24 hour period of time.  Take ibuprofen with food to avoid stomach upset.  You can also try warm salt water gargles, hot/warm water or tea with honey and/or lemon and/or Cepacol lozenges or spray for your sore throat.

## 2022-12-12 LAB — SARS-COV-2 RNA RESP QL NAA+PROBE: NEGATIVE

## 2022-12-12 PROCEDURE — C9803 HOPD COVID-19 SPEC COLLECT: HCPCS

## 2022-12-12 PROCEDURE — 99283 EMERGENCY DEPT VISIT LOW MDM: CPT | Mod: CS

## 2022-12-12 RX ORDER — DEXAMETHASONE SODIUM PHOSPHATE 10 MG/ML
10 INJECTION, SOLUTION INTRAMUSCULAR; INTRAVENOUS ONCE
Status: DISCONTINUED | OUTPATIENT
Start: 2022-12-13 | End: 2022-12-12

## 2022-12-13 ENCOUNTER — HOSPITAL ENCOUNTER (EMERGENCY)
Facility: CLINIC | Age: 32
Discharge: HOME | End: 2022-12-13
Attending: EMERGENCY MEDICINE | Admitting: EMERGENCY MEDICINE
Payer: COMMERCIAL

## 2022-12-13 VITALS
RESPIRATION RATE: 20 BRPM | BODY MASS INDEX: 36.1 KG/M2 | WEIGHT: 230 LBS | HEART RATE: 92 BPM | TEMPERATURE: 100.2 F | DIASTOLIC BLOOD PRESSURE: 80 MMHG | OXYGEN SATURATION: 97 % | SYSTOLIC BLOOD PRESSURE: 132 MMHG | HEIGHT: 67 IN

## 2022-12-13 DIAGNOSIS — J10.1 INFLUENZA A: ICD-10-CM

## 2022-12-13 LAB
DEPRECATED S PYO AG THROAT QL EIA: NEGATIVE
FLUAV RNA SPEC QL NAA+PROBE: POSITIVE
FLUBV RNA RESP QL NAA+PROBE: NEGATIVE
GROUP A STREP BY PCR: NOT DETECTED
RSV RNA SPEC NAA+PROBE: NEGATIVE
SARS-COV-2 RNA RESP QL NAA+PROBE: NEGATIVE

## 2022-12-13 PROCEDURE — 87651 STREP A DNA AMP PROBE: CPT | Performed by: EMERGENCY MEDICINE

## 2022-12-13 PROCEDURE — 250N000011 HC RX IP 250 OP 636: Performed by: EMERGENCY MEDICINE

## 2022-12-13 PROCEDURE — 87637 SARSCOV2&INF A&B&RSV AMP PRB: CPT | Performed by: EMERGENCY MEDICINE

## 2022-12-13 RX ORDER — ALBUTEROL SULFATE 90 UG/1
2 AEROSOL, METERED RESPIRATORY (INHALATION) EVERY 6 HOURS PRN
Qty: 18 G | Refills: 0 | Status: SHIPPED | OUTPATIENT
Start: 2022-12-13 | End: 2023-10-23

## 2022-12-13 RX ORDER — INHALER, ASSIST DEVICES
SPACER (EA) MISCELLANEOUS
Qty: 1 EACH | Refills: 0 | Status: SHIPPED | OUTPATIENT
Start: 2022-12-13 | End: 2023-10-23

## 2022-12-13 RX ADMIN — DEXAMETHASONE 10 MG: 4 TABLET ORAL at 00:07

## 2022-12-13 ASSESSMENT — ACTIVITIES OF DAILY LIVING (ADL): ADLS_ACUITY_SCORE: 35

## 2022-12-13 NOTE — ED PROVIDER NOTES
EMERGENCY DEPARTMENT ENCOUNTER      NAME: Phoenix Win  AGE: 32 year old male  YOB: 1990  MRN: [id number]  EVALUATION DATE & TIME: No admission date for patient encounter.    PCP: Navin Kinney    ED PROVIDER: José George D.O.      Chief Complaint   Patient presents with     Fever     Cough     Pharyngitis       FINAL IMPRESSION:  1. Influenza A        ED COURSE & MEDICAL DECISION MAKIN:44 PM I met with the patient to gather history and to perform my initial exam. I discussed the plan for care while in the Emergency Department.  12:00 AM Patient refused IV fluids          Pertinent Labs & Imaging studies reviewed. (See chart for details)  32 year old male presents to the Emergency Department for evaluation of body aches, fever, cough, congestion, fatigue, mild headache, for more than 48 hours.  Patient does have known exposure to influenza A, but was tested day ago that was negative for both influenza and COVID.  Patient did have some mild pharyngeal erythema on exam, but only minimal swelling, and no clinical evidence of be concerning for PTA or RPA.  Strep testing was negative.  Swab testing was performed that did show positive for influenza A with negative COVID, influenza B, and RSV.  Symptoms did have some improvement with Decadron in the emergency department.  At this time I do believe it is the influenza A causing the patient's symptoms, and I do believe he can be safely discharged home with outpatient follow-up with primary care provider.  Return precautions were discussed.    Medical Decision Making    History:    Supplemental history from: Family Member/Significant Other    External Record(s) reviewed: Documented in HPI, if applicable.    Work Up:    Chart documentation includes differential considered and any EKGs or imaging interpreted by provider.    In additional to work up documented, I considered the following work up: See chart documentation, if applicable.    External  consultation:    Discussion of management with another provider: See chart documentation, if applicable    Complicating factors:    Care impacted by chronic illness: None    Care affected by social determinants of health: N/A    Disposition considerations: Discharge. I prescribed additional prescription strength medication(s) as charted. I considered admission, but discharged patient after significant clinical improvement.        At the conclusion of the encounter I discussed the results of all of the tests and the disposition. The questions were answered. The patient or family acknowledged understanding and was agreeable with the care plan.      HPI    Patient information was obtained from: Patient and his mother    Use of : N/A        Phoenix Win is a 32 year old male with a history of epilepsy who presents to the ED via walk in for the evaluation of fever.     The patient started feeling ill Sunday morning with symptoms of body aches, chest pain, sore throat, congestion, cough, fatigue, mild headache and a high fever. Patient reports that his family members that he lives with recently tested positive for influenza, but he tested negative early on in the symptoms. Patient has been feeling worse which prompted him to present to the ED for further evaluation. He has been alternating tylenol and ibuprofen with some relief.     Patient has no significant medical history. No smoking or alcohol use.       REVIEW OF SYSTEMS  Constitutional:  Denies chills, weight loss or weakness. Positive for fever, malaise, fatigue.   Eyes:  No pain, discharge, redness  HENT:  Denies ear pain. Positive for sore throat and congestion   Respiratory: No SOB, wheeze. Positive for cough.   Cardiovascular:  No palpitations. Positive for chest pain.   GI:  Denies abdominal pain, nausea, vomiting, diarrhea  : Denies dysuria, hematuria  Musculoskeletal:  Denies any new muscle/joint pain, swelling or loss of function.  Skin:   Denies rash, pallor  Neurologic:  Denies focal weakness or sensory changes. Positive for headache.   Lymph: Denies swollen nodes    All other systems negative unless noted in HPI.    PAST MEDICAL HISTORY:  Past Medical History:   Diagnosis Date     Seizure (H)        PAST SURGICAL HISTORY:  History reviewed. No pertinent surgical history.      CURRENT MEDICATIONS:    No current facility-administered medications for this encounter.     Current Outpatient Medications   Medication     albuterol (PROAIR HFA/PROVENTIL HFA/VENTOLIN HFA) 108 (90 Base) MCG/ACT inhaler     spacer (OPTICHAMBER BRITTA) holding chamber     calcium-vitamin D 500 mg(1,250mg) -200 unit per tablet     ibuprofen (ADVIL,MOTRIN) 200 MG tablet     lacosamide (VIMPAT) 100 mg Tab     levETIRAcetam (KEPPRA PO)     Zonisamide (ZONEGRAN PO)         ALLERGIES:  Allergies   Allergen Reactions     Penicillins Unknown     Amoxicillin Rash     Cenobamate Other (See Comments)     Other reaction(s): Mental Status Change, Other (see comments)  Suicidal ideation and hallucinations  Suicidal ideation and hallucinations  [facility] Comment: Suicidal ideation and hallucinations; [facility] Reaction: Mental Status Change         FAMILY HISTORY:  No family history on file.    SOCIAL HISTORY:  Social History     Socioeconomic History     Marital status: Single     Spouse name: None     Number of children: None     Years of education: None     Highest education level: None   Tobacco Use     Smoking status: Never     Smokeless tobacco: Never   Substance and Sexual Activity     Alcohol use: No     Drug use: No     Sexual activity: Not Currently       VITALS:  Patient Vitals for the past 24 hrs:   BP Temp Temp src Pulse Resp SpO2 Height Weight   12/13/22 0057 132/80 -- -- 92 -- 97 % -- --   12/13/22 0055 -- -- -- 93 -- 97 % -- --   12/13/22 0054 -- -- -- 93 -- 97 % -- --   12/13/22 0053 -- -- -- 92 -- 96 % -- --   12/13/22 0052 -- -- -- 92 -- 97 % -- --   12/12/22 2240 129/85 100.2  F  "(37.9  C) Oral (!) 127 20 98 % 1.702 m (5' 7\") 104.3 kg (230 lb)       PHYSICAL EXAM    VITAL SIGNS: /80   Pulse 92   Temp 100.2  F (37.9  C) (Oral)   Resp 20   Ht 1.702 m (5' 7\")   Wt 104.3 kg (230 lb)   SpO2 97%   BMI 36.02 kg/m      General Appearance: Well-appearing, well-nourished, no acute distress   Head:  Normocephalic, without obvious abnormality, atraumatic  Eyes:  PERRL, conjunctiva/corneas clear, EOM's intact,  ENT:  Lips, mucosa, and tongue normal, membranes are moist without pallor. Pharynx erythematous and mild swelling symmetrically.   Neck:  Normal ROM, symmetrical, trachea midline    Cardio:  Regular rate and rhythm, no murmur, rub or gallop, 2+ pulses symmetric in all extremities  Pulm:  Clear to auscultation bilaterally, respirations unlabored,  Abdomen:  Soft, non-tender, no rebound or guarding.  Musculoskeletal: Full ROM, no edema, no cyanosis, good ROM of major joints  Integument:  Warm, Dry, No erythema, No rash.    Neurologic:  Alert & oriented.  No focal deficits appreciated.  Ambulatory.  Psychiatric:  Affect normal, Judgment normal, Mood normal.      LABS  Results for orders placed or performed during the hospital encounter of 12/13/22 (from the past 24 hour(s))   Symptomatic Influenza A/B & SARS-CoV2 (COVID-19) Virus PCR Multiplex Nasopharyngeal    Specimen: Nasopharyngeal; Swab   Result Value Ref Range    Influenza A PCR Positive (A) Negative    Influenza B PCR Negative Negative    RSV PCR Negative Negative    SARS CoV2 PCR Negative Negative    Narrative    Testing was performed using the Xpert Xpress CoV2/Flu/RSV Assay on the [employer] GeneXpert Instrument. This test should be ordered for the detection of SARS-CoV-2 and influenza viruses in individuals who meet clinical and/or epidemiological criteria. Test performance is unknown in asymptomatic patients. This test is for in vitro diagnostic use under the FDA EUA for laboratories certified under CLIA to perform high or " moderate complexity testing. This test has not been FDA cleared or approved. A negative result does not rule out the presence of PCR inhibitors in the specimen or target RNA in concentration below the limit of detection for the assay. If only one viral target is positive but coinfection with multiple targets is suspected, the sample should be re-tested with another FDA cleared, approved, or authorized test, if coinfection would change clinical management. This test was validated by the [facility]. These laboratories are certified under the Clinical Laboratory Improvement Amendments of 1988 (CLIA-88) as qualified to perform high complexity laboratory testing.   Streptococcus A Rapid Scr w Reflx to PCR    Specimen: Throat; Swab   Result Value Ref Range    Group A Strep antigen Negative Negative         RADIOLOGY  No orders to display            MEDICATIONS GIVEN IN THE EMERGENCY:  Medications   dexamethasone (DECADRON) tablet 10 mg (10 mg Oral Given 12/13/22 0007)       NEW PRESCRIPTIONS STARTED AT TODAY'S ER VISIT  Discharge Medication List as of 12/13/2022  1:16 AM             IMallory, am serving as a scribe to document services personally performed by José George D.O, based on my observations and the provider's statements to me. I, José George D.O, attest that Mallory Mensah is acting in a scribe capacity, has observed my performance of the services and has documented them in accordance with my direction.     José George D.O.  Emergency Medicine  [facility] EMERGENCY ROOM  [address] [address]  [phone]  Dept: [phone]     José George,   12/13/22 0128

## 2022-12-13 NOTE — ED NOTES
Exposure to Influenza A from parents. URI sx's x 3 days. Last took ibuprofen at 2100, 12/12/2022.

## 2022-12-13 NOTE — ED TRIAGE NOTES
Pt exposed to influenza. On 12/11 started to have fevers at home and tested for flue and Covid. Both were negative. Today still having fevers. Temp of 100.2 in triage. Last took tylenol at 2100 and Ibuprofen at 2100     Triage Assessment     Row Name 12/12/22 5298       Triage Assessment (Adult)    Airway WDL WDL       Respiratory WDL    Respiratory WDL X;cough    Cough Frequency infrequent       Skin Circulation/Temperature WDL    Skin Circulation/Temperature WDL X;temperature    Skin Temperature warm       Cardiac WDL    Cardiac WDL X;rhythm    Pulse Rate & Regularity tachycardic       Peripheral/Neurovascular WDL    Peripheral Neurovascular WDL WDL       Cognitive/Neuro/Behavioral WDL    Cognitive/Neuro/Behavioral WDL WDL

## 2022-12-26 ENCOUNTER — OFFICE VISIT (OUTPATIENT)
Dept: FAMILY MEDICINE | Facility: CLINIC | Age: 32
End: 2022-12-26
Payer: COMMERCIAL

## 2022-12-26 ENCOUNTER — HOSPITAL ENCOUNTER (OUTPATIENT)
Dept: GENERAL RADIOLOGY | Facility: HOSPITAL | Age: 32
Discharge: HOME | End: 2022-12-26
Attending: FAMILY MEDICINE | Admitting: FAMILY MEDICINE
Payer: COMMERCIAL

## 2022-12-26 VITALS
HEART RATE: 79 BPM | DIASTOLIC BLOOD PRESSURE: 81 MMHG | RESPIRATION RATE: 18 BRPM | WEIGHT: 230 LBS | TEMPERATURE: 97.8 F | BODY MASS INDEX: 36.02 KG/M2 | OXYGEN SATURATION: 97 % | SYSTOLIC BLOOD PRESSURE: 121 MMHG

## 2022-12-26 DIAGNOSIS — R68.89 FLU-LIKE SYMPTOMS: ICD-10-CM

## 2022-12-26 DIAGNOSIS — R68.89 FLU-LIKE SYMPTOMS: Primary | ICD-10-CM

## 2022-12-26 DIAGNOSIS — J01.90 ACUTE NON-RECURRENT SINUSITIS, UNSPECIFIED LOCATION: ICD-10-CM

## 2022-12-26 LAB
FLUAV AG SPEC QL IA: NEGATIVE
FLUBV AG SPEC QL IA: NEGATIVE
SARS-COV-2 RNA RESP QL NAA+PROBE: NEGATIVE

## 2022-12-26 PROCEDURE — 71046 X-RAY EXAM CHEST 2 VIEWS: CPT

## 2022-12-26 PROCEDURE — U0003 INFECTIOUS AGENT DETECTION BY NUCLEIC ACID (DNA OR RNA); SEVERE ACUTE RESPIRATORY SYNDROME CORONAVIRUS 2 (SARS-COV-2) (CORONAVIRUS DISEASE [COVID-19]), AMPLIFIED PROBE TECHNIQUE, MAKING USE OF HIGH THROUGHPUT TECHNOLOGIES AS DESCRIBED BY CMS-2020-01-R: HCPCS | Performed by: FAMILY MEDICINE

## 2022-12-26 PROCEDURE — 99214 OFFICE O/P EST MOD 30 MIN: CPT | Performed by: FAMILY MEDICINE

## 2022-12-26 PROCEDURE — U0005 INFEC AGEN DETEC AMPLI PROBE: HCPCS | Performed by: FAMILY MEDICINE

## 2022-12-26 PROCEDURE — 87804 INFLUENZA ASSAY W/OPTIC: CPT | Performed by: FAMILY MEDICINE

## 2022-12-26 RX ORDER — CEFDINIR 300 MG/1
300 CAPSULE ORAL 2 TIMES DAILY
Qty: 14 CAPSULE | Refills: 0 | Status: SHIPPED | OUTPATIENT
Start: 2022-12-26 | End: 2023-01-02

## 2022-12-26 RX ORDER — BENZONATATE 100 MG/1
100-200 CAPSULE ORAL 3 TIMES DAILY PRN
Qty: 30 CAPSULE | Refills: 0 | Status: SHIPPED | OUTPATIENT
Start: 2022-12-26 | End: 2023-10-23

## 2022-12-26 NOTE — PROGRESS NOTES
ICD-10-CM    1. Flu-like symptoms  R68.89 Symptomatic COVID-19 Virus (Coronavirus) by PCR Nose     Influenza A & B Antigen - Clinic Collect     XR Chest 2 Views     benzonatate (TESSALON) 100 MG capsule      2. Acute non-recurrent sinusitis, unspecified location  J01.90 cefdinir (OMNICEF) 300 MG capsule       discussed options this may be new viral illness or pssibly sinusitis. Will trat as the latter. covid test pending.   -------------------------------  Phoeinxprerna Win with presents with influenza abou 2 weeks ago. symptoms improving then renewed fevers, cough, and sius pain and cognestion these past 2 days. No shortness of breath.     Exposures--no new exposures      Current Outpatient Medications   Medication Sig Dispense Refill     albuterol (PROAIR HFA/PROVENTIL HFA/VENTOLIN HFA) 108 (90 Base) MCG/ACT inhaler Inhale 2 puffs into the lungs every 6 hours as needed for shortness of breath, wheezing or cough 18 g 0     benzonatate (TESSALON) 100 MG capsule Take 1-2 capsules (100-200 mg) by mouth 3 times daily as needed for cough 30 capsule 0     calcium-vitamin D 500 mg(1,250mg) -200 unit per tablet [CALCIUM-VITAMIN D 500 MG(1,250MG) -200 UNIT PER TABLET] Take 1 tablet by mouth every morning.       cefdinir (OMNICEF) 300 MG capsule Take 1 capsule (300 mg) by mouth 2 times daily for 7 days 14 capsule 0     ibuprofen (ADVIL,MOTRIN) 200 MG tablet [IBUPROFEN (ADVIL,MOTRIN) 200 MG TABLET] Take 200-400 mg by mouth.       lacosamide (VIMPAT) 100 mg Tab [LACOSAMIDE (VIMPAT) 100 MG TAB] 550 mg.        levETIRAcetam (KEPPRA PO) Take 1,000 mg by mouth Taking 5000 mg       spacer (OPTICHAMBER BRITTA) holding chamber Use with albuterol inhaler 1 each 0     Zonisamide (ZONEGRAN PO)          ROS otherwise negative for resp., ID,  HEENT symptoms.    Objective: /81 (BP Location: Right arm, Patient Position: Sitting, Cuff Size: Adult Large)   Pulse 79   Temp 97.8  F (36.6  C) (Oral)   Resp 18   Wt 104.3 kg (230 lb)    SpO2 97%   BMI 36.02 kg/m    Exam:  GENERAL APPEARANCE: healthy, alert and no distress  EYES: Eyes grossly normal to inspection  HENT: ear canals and TM's normal and nose and mouth without ulcers or lesions  NECK: no adenopathy, no asymmetry, masses, or scars and thyroid normal to palpation  RESP: lungs clear to auscultation - no rales, rhonchi or wheezes  CV: regular rates and rhythm, no murmur    cxr normal.     Results for orders placed or performed during the hospital encounter of 12/26/22   XR Chest 2 Views     Status: None    Narrative    EXAM: XR CHEST 2 VIEWS  LOCATION: [facility]  DATE/TIME: 12/26/2022 12:31 PM    INDICATION:  Flu-like symptoms  COMPARISON: 01/05/2022      Impression    IMPRESSION: Heart size is normal. No pleural effusion, pneumothorax, or abnormal area of consolidation.   Results for orders placed or performed in visit on 12/26/22   Influenza A & B Antigen - Clinic Collect     Status: Normal    Specimen: Nose; Swab   Result Value Ref Range    Influenza A antigen Negative Negative    Influenza B antigen Negative Negative    Narrative    Test results must be correlated with clinical data. If necessary, results should be confirmed by a molecular assay or viral culture.

## 2023-01-27 ENCOUNTER — LAB (OUTPATIENT)
Dept: LAB | Facility: CLINIC | Age: 33
End: 2023-01-27
Payer: COMMERCIAL

## 2023-01-27 DIAGNOSIS — G40.209 COMPLEX PARTIAL SEIZURES WITH CONSCIOUSNESS IMPAIRED (H): ICD-10-CM

## 2023-01-27 LAB
ALBUMIN SERPL BCG-MCNC: 4.8 G/DL (ref 3.5–5.2)
ALP SERPL-CCNC: 63 U/L (ref 40–129)
ALT SERPL W P-5'-P-CCNC: 35 U/L (ref 10–50)
ANION GAP SERPL CALCULATED.3IONS-SCNC: 11 MMOL/L (ref 7–15)
AST SERPL W P-5'-P-CCNC: 25 U/L (ref 10–50)
BILIRUB SERPL-MCNC: 0.6 MG/DL
BUN SERPL-MCNC: 19.2 MG/DL (ref 6–20)
CALCIUM SERPL-MCNC: 8.9 MG/DL (ref 8.6–10)
CHLORIDE SERPL-SCNC: 109 MMOL/L (ref 98–107)
CREAT SERPL-MCNC: 1.03 MG/DL (ref 0.67–1.17)
DEPRECATED HCO3 PLAS-SCNC: 18 MMOL/L (ref 22–29)
ERYTHROCYTE [DISTWIDTH] IN BLOOD BY AUTOMATED COUNT: 12.1 % (ref 10–15)
GFR SERPL CREATININE-BSD FRML MDRD: >90 ML/MIN/1.73M2
GLUCOSE SERPL-MCNC: 101 MG/DL (ref 70–99)
HCT VFR BLD AUTO: 45.7 % (ref 40–53)
HGB BLD-MCNC: 16.6 G/DL (ref 13.3–17.7)
LEVETIRACETAM SERPL-MCNC: 30.4 ΜG/ML (ref 10–40)
MCH RBC QN AUTO: 30.8 PG (ref 26.5–33)
MCHC RBC AUTO-ENTMCNC: 36.3 G/DL (ref 31.5–36.5)
MCV RBC AUTO: 85 FL (ref 78–100)
PLATELET # BLD AUTO: 166 10E3/UL (ref 150–450)
POTASSIUM SERPL-SCNC: 3.9 MMOL/L (ref 3.4–5.3)
PROT SERPL-MCNC: 7.1 G/DL (ref 6.4–8.3)
RBC # BLD AUTO: 5.39 10E6/UL (ref 4.4–5.9)
SODIUM SERPL-SCNC: 138 MMOL/L (ref 136–145)
WBC # BLD AUTO: 6.8 10E3/UL (ref 4–11)

## 2023-01-27 PROCEDURE — 36415 COLL VENOUS BLD VENIPUNCTURE: CPT

## 2023-01-27 PROCEDURE — 85027 COMPLETE CBC AUTOMATED: CPT

## 2023-01-27 PROCEDURE — 80235 DRUG ASSAY LACOSAMIDE: CPT | Mod: 90

## 2023-01-27 PROCEDURE — 80177 DRUG SCRN QUAN LEVETIRACETAM: CPT

## 2023-01-27 PROCEDURE — 99000 SPECIMEN HANDLING OFFICE-LAB: CPT

## 2023-01-27 PROCEDURE — 80053 COMPREHEN METABOLIC PANEL: CPT

## 2023-01-27 PROCEDURE — 80203 DRUG SCREEN QUANT ZONISAMIDE: CPT | Mod: 90

## 2023-01-29 LAB — ZONISAMIDE SERPL-MCNC: 17 UG/ML

## 2023-02-01 LAB — LACOSAMIDE SERPL-MCNC: 10.8 UG/ML

## 2023-02-27 ENCOUNTER — TRANSFERRED RECORDS (OUTPATIENT)
Dept: HEALTH INFORMATION MANAGEMENT | Facility: CLINIC | Age: 33
End: 2023-02-27

## 2023-03-30 ENCOUNTER — LAB (OUTPATIENT)
Dept: FAMILY MEDICINE | Facility: CLINIC | Age: 33
End: 2023-03-30
Attending: FAMILY MEDICINE
Payer: COMMERCIAL

## 2023-03-30 DIAGNOSIS — Z01.818 PRE-OPERATIVE GENERAL PHYSICAL EXAMINATION: ICD-10-CM

## 2023-03-30 PROCEDURE — U0003 INFECTIOUS AGENT DETECTION BY NUCLEIC ACID (DNA OR RNA); SEVERE ACUTE RESPIRATORY SYNDROME CORONAVIRUS 2 (SARS-COV-2) (CORONAVIRUS DISEASE [COVID-19]), AMPLIFIED PROBE TECHNIQUE, MAKING USE OF HIGH THROUGHPUT TECHNOLOGIES AS DESCRIBED BY CMS-2020-01-R: HCPCS

## 2023-03-30 PROCEDURE — U0005 INFEC AGEN DETEC AMPLI PROBE: HCPCS

## 2023-03-31 LAB — SARS-COV-2 RNA RESP QL NAA+PROBE: NEGATIVE

## 2023-05-23 ENCOUNTER — TRANSFERRED RECORDS (OUTPATIENT)
Dept: HEALTH INFORMATION MANAGEMENT | Facility: CLINIC | Age: 33
End: 2023-05-23

## 2023-09-05 ENCOUNTER — TRANSFERRED RECORDS (OUTPATIENT)
Dept: HEALTH INFORMATION MANAGEMENT | Facility: CLINIC | Age: 33
End: 2023-09-05
Payer: COMMERCIAL

## 2023-10-21 ENCOUNTER — HEALTH MAINTENANCE LETTER (OUTPATIENT)
Age: 33
End: 2023-10-21

## 2023-10-23 ENCOUNTER — OFFICE VISIT (OUTPATIENT)
Dept: FAMILY MEDICINE | Facility: CLINIC | Age: 33
End: 2023-10-23
Payer: COMMERCIAL

## 2023-10-23 VITALS
DIASTOLIC BLOOD PRESSURE: 88 MMHG | TEMPERATURE: 97.8 F | SYSTOLIC BLOOD PRESSURE: 126 MMHG | OXYGEN SATURATION: 96 % | WEIGHT: 241 LBS | RESPIRATION RATE: 18 BRPM | BODY MASS INDEX: 37.75 KG/M2 | HEART RATE: 73 BPM
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
## 2023-10-23 DIAGNOSIS — S40.262A TICK BITE OF LEFT SHOULDER, INITIAL ENCOUNTER: Primary | ICD-10-CM

## 2023-10-23 PROCEDURE — 99213 OFFICE O/P EST LOW 20 MIN: CPT | Performed by: PHYSICIAN ASSISTANT

## 2023-10-23 RX ORDER — LORAZEPAM 2 MG/ML
2 CONCENTRATE ORAL
COMMUNITY

## 2023-10-23 RX ORDER — DOXYCYCLINE 100 MG/1
200 CAPSULE ORAL ONCE
Qty: 2 CAPSULE | Refills: 0 | Status: SHIPPED | OUTPATIENT
Start: 2023-10-23 | End: 2023-10-23

## 2023-10-23 RX ORDER — DIAZEPAM 10 MG/100UL
SPRAY NASAL
COMMUNITY

## 2023-10-23 RX ORDER — THIAMINE HCL 100 MG
2 TABLET ORAL
COMMUNITY

## 2023-10-23 NOTE — PATIENT INSTRUCTIONS
?Most infections with Borrelia species, which cause Lyme disease, are transmitted by a nymphal stage Ixodes species tick. This immature stage of the tick is quite small.     ?Borrelia burgdorferi is not usually transmitted within the first 48 to 72 hours of tick attachment. The likelihood of transmission is increased if the tick is engorged and/or has been attached for at least 72 hours.     ?Ticks are best removed with tweezers or protected fingers, pulling straight up with even pressure.   ?We suggest that patients who meet all criteria for antibiotic prophylaxis be offered a single dose of doxycycline if not contraindicated.      The criteria for prophylaxis include:  1. Attached tick identified as an adult or nymphal Ixodes scapularis tick (deer tick)  2. Tick is estimated to have been attached for ?36 hours (by degree of engorgement or time of exposure)  3. Prophylaxis is begun within 72 hours of tick removal  4. Local rate of infection of ticks with B. burgdorferi is ?20 percent (these rates of infection have been shown to occur in parts of [place], parts of the [place], parts of [place] and [place])  5. Doxycycline is not contraindicated (ie, the patient is not <8 years of age, pregnant, or lactating)    ?If the patient meets all of the above criteria, the recommended dose of doxycycline is 200 mg for adults and 4 mg/kg up to a maximum dose of 200 mg in children ?8 years, given as a single dose. If the patient cannot take doxycycline, we do not recommend prophylaxis with an alternate antibiotic. A reasonable alternative for patients who meet criteria for prophylaxis but who prefer not to take antimicrobial prophylaxis is to observe the tick bite site and treat for Lyme disease if an erythema migrans (EM) rash develops.

## 2023-10-23 NOTE — PROGRESS NOTES
Patient presents with:  Tick Bite: Upper left arm red and swollen        Clinical Decision Making:  Deer tick bite.  Patient meets requirements for prophylactic therapy.  Doxycycline prescribed.  We discussed signs and symptoms and reasons to follow-up.      ICD-10-CM    1. Tick bite of left shoulder, initial encounter  S40.262A doxycycline hyclate (VIBRAMYCIN) 100 MG capsule    W57.XXXA           Patient Instructions   ?Most infections with Borrelia species, which cause Lyme disease, are transmitted by a nymphal stage Ixodes species tick. This immature stage of the tick is quite small.     ?Borrelia burgdorferi is not usually transmitted within the first 48 to 72 hours of tick attachment. The likelihood of transmission is increased if the tick is engorged and/or has been attached for at least 72 hours.     ?Ticks are best removed with tweezers or protected fingers, pulling straight up with even pressure.   ?We suggest that patients who meet all criteria for antibiotic prophylaxis be offered a single dose of doxycycline if not contraindicated.      The criteria for prophylaxis include:  1. Attached tick identified as an adult or nymphal Ixodes scapularis tick (deer tick)  2. Tick is estimated to have been attached for ?36 hours (by degree of engorgement or time of exposure)  3. Prophylaxis is begun within 72 hours of tick removal  4. Local rate of infection of ticks with B. burgdorferi is ?20 percent (these rates of infection have been shown to occur in parts of [place], parts of the [place], parts of [place] and [place])  5. Doxycycline is not contraindicated (ie, the patient is not <8 years of age, pregnant, or lactating)    ?If the patient meets all of the above criteria, the recommended dose of doxycycline is 200 mg for adults and 4 mg/kg up to a maximum dose of 200 mg in children ?8 years, given as a single dose. If the patient cannot take doxycycline, we do not recommend prophylaxis with an  alternate antibiotic. A reasonable alternative for patients who meet criteria for prophylaxis but who prefer not to take antimicrobial prophylaxis is to observe the tick bite site and treat for Lyme disease if an erythema migrans (EM) rash develops.      HPI:  Phoenix Win is a 33 year old male who presents today complaining of deer tick bite on left shoulder noted earlier this morning.  He removed the tick.  He is here for prophylactic therapy which she has previously tolerated before.    History obtained from the patient.    Problem List:  2019-07: Spells of decreased attentiveness  2018-05: Epilepsy with partial complex seizures, without status   epilepticus (H)  2018-01: Complex partial seizures with consciousness impaired (H)  2015-01: Acute respiratory failure (H)  2015-01: Sepsis (H)  Epilepsy      Past Medical History:   Diagnosis Date    Seizure (H)        Social History     Tobacco Use    Smoking status: Never    Smokeless tobacco: Never   Substance Use Topics    Alcohol use: No       Review of Systems    Vitals:    10/23/23 1711   BP: 126/88   BP Location: Right arm   Patient Position: Sitting   Cuff Size: Adult Large   Pulse: 73   Resp: 18   Temp: 97.8  F (36.6  C)   TempSrc: Oral   SpO2: 96%   Weight: 109.3 kg (241 lb)       Physical Exam  Vitals and nursing note reviewed.   Constitutional:       General: He is not in acute distress.     Appearance: He is not toxic-appearing or diaphoretic.   HENT:      Head: Normocephalic and atraumatic.      Right Ear: External ear normal.      Left Ear: External ear normal.   Eyes:      Conjunctiva/sclera: Conjunctivae normal.   Pulmonary:      Effort: Pulmonary effort is normal. No respiratory distress.   Skin:            Comments: Tick bite, see graphic for location.  Mild surrounding erythema.  Retained pincers in skin.   Neurological:      Mental Status: He is alert.   Psychiatric:         Mood and Affect: Mood normal.         Behavior: Behavior normal.          Thought Content: Thought content normal.         Judgment: Judgment normal.         At the end of the encounter, I discussed results, diagnosis, medications. Discussed red flags for immediate return to clinic/ER, as well as indications for follow up if no improvement. Patient understood and agreed to plan. Patient was stable for discharge.

## 2024-01-11 ENCOUNTER — TRANSFERRED RECORDS (OUTPATIENT)
Dept: HEALTH INFORMATION MANAGEMENT | Facility: CLINIC | Age: 34
End: 2024-01-11

## 2024-11-19 ENCOUNTER — TRANSFERRED RECORDS (OUTPATIENT)
Dept: HEALTH INFORMATION MANAGEMENT | Facility: CLINIC | Age: 34
End: 2024-11-19

## 2024-11-21 DIAGNOSIS — G40.209 COMPLEX PARTIAL SEIZURES WITH CONSCIOUSNESS IMPAIRED (H): ICD-10-CM

## 2024-11-21 DIAGNOSIS — Z79.899 NEED FOR PROPHYLACTIC CHEMOTHERAPY: Primary | ICD-10-CM

## 2024-11-21 LAB
CREAT SERPL-MCNC: 1.02 MG/DL (ref 0.67–1.17)
EGFRCR SERPLBLD CKD-EPI 2021: >90 ML/MIN/1.73M2
LEVETIRACETAM SERPL-MCNC: 31.2 ÂΜG/ML (ref 10–40)

## 2024-11-23 LAB — LACOSAMIDE SERPL-MCNC: 6.4 UG/ML

## 2024-12-14 ENCOUNTER — HEALTH MAINTENANCE LETTER (OUTPATIENT)
Age: 34
End: 2024-12-14

## 2025-01-13 ENCOUNTER — NURSE TRIAGE (OUTPATIENT)
Dept: NURSING | Facility: CLINIC | Age: 35
End: 2025-01-13

## 2025-01-13 LAB
ATRIAL RATE - MUSE: 108 BPM
DIASTOLIC BLOOD PRESSURE - MUSE: 104 MMHG
INTERPRETATION ECG - MUSE: NORMAL
P AXIS - MUSE: 40 DEGREES
PR INTERVAL - MUSE: 152 MS
QRS DURATION - MUSE: 94 MS
QT - MUSE: 322 MS
QTC - MUSE: 431 MS
R AXIS - MUSE: 49 DEGREES
SYSTOLIC BLOOD PRESSURE - MUSE: 222 MMHG
T AXIS - MUSE: 3 DEGREES
VENTRICULAR RATE- MUSE: 108 BPM

## 2025-01-13 PROCEDURE — 85379 FIBRIN DEGRADATION QUANT: CPT

## 2025-01-13 PROCEDURE — 84484 ASSAY OF TROPONIN QUANT: CPT

## 2025-01-13 PROCEDURE — 96374 THER/PROPH/DIAG INJ IV PUSH: CPT

## 2025-01-13 PROCEDURE — 250N000011 HC RX IP 250 OP 636

## 2025-01-13 PROCEDURE — 85027 COMPLETE CBC AUTOMATED: CPT

## 2025-01-13 PROCEDURE — 250N000013 HC RX MED GY IP 250 OP 250 PS 637: Performed by: EMERGENCY MEDICINE

## 2025-01-13 PROCEDURE — 93005 ELECTROCARDIOGRAM TRACING: CPT

## 2025-01-13 PROCEDURE — 80048 BASIC METABOLIC PNL TOTAL CA: CPT

## 2025-01-13 PROCEDURE — 99285 EMERGENCY DEPT VISIT HI MDM: CPT | Mod: 25

## 2025-01-13 PROCEDURE — 36415 COLL VENOUS BLD VENIPUNCTURE: CPT

## 2025-01-13 RX ORDER — ACETAMINOPHEN 325 MG/1
975 TABLET ORAL ONCE
Status: COMPLETED | OUTPATIENT
Start: 2025-01-13 | End: 2025-01-13

## 2025-01-13 RX ORDER — KETOROLAC TROMETHAMINE 15 MG/ML
15 INJECTION, SOLUTION INTRAMUSCULAR; INTRAVENOUS ONCE
Status: COMPLETED | OUTPATIENT
Start: 2025-01-13 | End: 2025-01-13

## 2025-01-13 RX ADMIN — ACETAMINOPHEN 975 MG: 325 TABLET ORAL at 22:19

## 2025-01-13 RX ADMIN — KETOROLAC TROMETHAMINE 15 MG: 15 INJECTION, SOLUTION INTRAMUSCULAR; INTRAVENOUS at 23:51

## 2025-01-13 ASSESSMENT — COLUMBIA-SUICIDE SEVERITY RATING SCALE - C-SSRS
6. HAVE YOU EVER DONE ANYTHING, STARTED TO DO ANYTHING, OR PREPARED TO DO ANYTHING TO END YOUR LIFE?: NO
2. HAVE YOU ACTUALLY HAD ANY THOUGHTS OF KILLING YOURSELF IN THE PAST MONTH?: NO
1. IN THE PAST MONTH, HAVE YOU WISHED YOU WERE DEAD OR WISHED YOU COULD GO TO SLEEP AND NOT WAKE UP?: NO

## 2025-01-14 ENCOUNTER — APPOINTMENT (OUTPATIENT)
Dept: RADIOLOGY | Facility: CLINIC | Age: 35
End: 2025-01-14
Payer: COMMERCIAL

## 2025-01-14 ENCOUNTER — HOSPITAL ENCOUNTER (EMERGENCY)
Facility: CLINIC | Age: 35
Discharge: HOME | End: 2025-01-14
Attending: EMERGENCY MEDICINE | Admitting: EMERGENCY MEDICINE
Payer: COMMERCIAL

## 2025-01-14 VITALS
HEIGHT: 68 IN | HEART RATE: 100 BPM | SYSTOLIC BLOOD PRESSURE: 120 MMHG | WEIGHT: 240 LBS | DIASTOLIC BLOOD PRESSURE: 84 MMHG | BODY MASS INDEX: 36.37 KG/M2 | RESPIRATION RATE: 27 BRPM | OXYGEN SATURATION: 96 % | TEMPERATURE: 100 F

## 2025-01-14 DIAGNOSIS — U07.1 COVID-19: ICD-10-CM

## 2025-01-14 LAB
ANION GAP SERPL CALCULATED.3IONS-SCNC: 16 MMOL/L (ref 7–15)
BUN SERPL-MCNC: 12.5 MG/DL (ref 6–20)
CALCIUM SERPL-MCNC: 9.3 MG/DL (ref 8.8–10.4)
CHLORIDE SERPL-SCNC: 104 MMOL/L (ref 98–107)
CREAT SERPL-MCNC: 1.08 MG/DL (ref 0.67–1.17)
D DIMER PPP FEU-MCNC: <=0.27 UG/ML FEU (ref 0–0.5)
EGFRCR SERPLBLD CKD-EPI 2021: >90 ML/MIN/1.73M2
ERYTHROCYTE [DISTWIDTH] IN BLOOD BY AUTOMATED COUNT: 11.9 % (ref 10–15)
FLUAV RNA SPEC QL NAA+PROBE: NEGATIVE
FLUBV RNA RESP QL NAA+PROBE: NEGATIVE
GLUCOSE SERPL-MCNC: 93 MG/DL (ref 70–99)
HCO3 SERPL-SCNC: 22 MMOL/L (ref 22–29)
HCT VFR BLD AUTO: 44.9 % (ref 40–53)
HGB BLD-MCNC: 16.1 G/DL (ref 13.3–17.7)
MCH RBC QN AUTO: 30.6 PG (ref 26.5–33)
MCHC RBC AUTO-ENTMCNC: 35.9 G/DL (ref 31.5–36.5)
MCV RBC AUTO: 85 FL (ref 78–100)
PLATELET # BLD AUTO: 155 10E3/UL (ref 150–450)
POTASSIUM SERPL-SCNC: 3.6 MMOL/L (ref 3.4–5.3)
RBC # BLD AUTO: 5.26 10E6/UL (ref 4.4–5.9)
RSV RNA SPEC NAA+PROBE: NEGATIVE
SARS-COV-2 RNA RESP QL NAA+PROBE: POSITIVE
SODIUM SERPL-SCNC: 142 MMOL/L (ref 135–145)
TROPONIN T SERPL HS-MCNC: <6 NG/L
WBC # BLD AUTO: 7.1 10E3/UL (ref 4–11)

## 2025-01-14 PROCEDURE — 87637 SARSCOV2&INF A&B&RSV AMP PRB: CPT

## 2025-01-14 PROCEDURE — 71046 X-RAY EXAM CHEST 2 VIEWS: CPT

## 2025-01-14 PROCEDURE — 250N000013 HC RX MED GY IP 250 OP 250 PS 637: Performed by: EMERGENCY MEDICINE

## 2025-01-14 RX ADMIN — NIRMATRELVIR AND RITONAVIR 3 TABLET: KIT at 02:24

## 2025-01-14 ASSESSMENT — ACTIVITIES OF DAILY LIVING (ADL)
ADLS_ACUITY_SCORE: 41
ADLS_ACUITY_SCORE: 41

## 2025-01-14 NOTE — DISCHARGE INSTRUCTIONS
You have COVID.  I will treat you with Paxlovid for the next 5 days to minimize your risk considering your other medical conditions.    Return to the ER for any acute chest pain or difficulty breathing.  Otherwise, follow-up with primary care.    You can use other supportive care including Tylenol, ibuprofen.  You can take Tylenol 1000 mg then 3 hours or take ibuprofen 800 mg.  You can take Tylenol up to 4 times per day and ibuprofen up to 3 times per day.  It is important to maintain your hydration.  Drink a lot of water along with supplementing with electrolytes such as Gatorade or Pedialyte.  Can also use Flonase for any nasal congestion, honey for cough and sore throat.    Your covid test is positive

## 2025-01-14 NOTE — TELEPHONE ENCOUNTER
Additional Information    Commented on: Fever > 103 F (39.4 C)     Patient reports he has not been seen at [facility] for about 7 years    Protocols used: COVID-19 - Diagnosed or [id number]

## 2025-01-14 NOTE — CONFIDENTIAL NOTE
Nurse Triage SBAR    Is this a 2nd Level Triage? YES, LICENSED PRACTITIONER REVIEW IS REQUIRED    Started feeling ill last night; feels worse today  Fever 103 F at pm and sore throat  Positive for COVID-19  104.7 F (forehead) - most recent  Has epilepsy  Disposition: Be seen in ED within 4 hours due to high temp          COVID Positive/Requesting COVID treatment    Patient is positive for COVID and requesting treatment options.    Date of positive COVID test (PCR or at home)? 1/13/25  Current COVID symptoms: fever or chills, cough, shortness of breath or difficulty breathing, fatigue, muscle or body aches, and sore throat  Date COVID symptoms began: 1/12/25    Message should be routed to clinic RN pool. Best phone number to use for call back: [phone]

## 2025-01-14 NOTE — ED TRIAGE NOTES
Repots testing positive for covid this morning with symptoms starting yesterday. Feeling short of breath with fever/chills and generalized body aches. Hx epilepsy, taking medication as prescribed. Last had ibuprofen at 1930.      Triage Assessment (Adult)       Row Name 01/13/25 0347          Triage Assessment    Airway WDL WDL        Respiratory WDL    Respiratory WDL X;rhythm/pattern     Rhythm/Pattern, Respiratory tachypneic;shortness of breath        Skin Circulation/Temperature WDL    Skin Circulation/Temperature WDL X;temperature     Skin Temperature warm        Cardiac WDL    Cardiac WDL X;rhythm     Pulse Rate & Regularity tachycardic        Peripheral/Neurovascular WDL    Peripheral Neurovascular WDL WDL        Cognitive/Neuro/Behavioral WDL    Cognitive/Neuro/Behavioral WDL WDL

## 2025-01-14 NOTE — ED NOTES
EMERGENCY DEPARTMENT SIGN OUT NOTE        ED COURSE AND MEDICAL DECISION MAKING  Patient was signed out to me by Zamzam Omer PA-C at 1:15 AM  I updated and rechecked patient.  Advised him of his positive COVID test.  The patient was expecting a dose of the Paxlovid.  Paxlovid 300 mg p.o. was administered.    In brief, Phoenix Win is a 34 year old male who initially presented  for evaluation of Covid. Endorses fever, sore throat, lower chest pressure, and difficulty breathing.      At time of sign out, disposition was pending Covid and influenza results.    FINAL IMPRESSION    1. COVID-19        ED MEDS  Medications   acetaminophen (TYLENOL) tablet 975 mg (975 mg Oral $Given 1/13/25 6536)   ketorolac (TORADOL) injection 15 mg (15 mg Intravenous $Given 1/13/25 9143)   nirmatrelvir and ritonavir (PAXLOVID) 300 mg/100 mg therapy pack 3 tablet (3 tablets Oral $Given 1/14/25 5897)       LAB  Labs Ordered and Resulted from Time of ED Arrival to Time of ED Departure   BASIC METABOLIC PANEL - Abnormal       Result Value    Sodium 142      Potassium 3.6      Chloride 104      Carbon Dioxide (CO2) 22      Anion Gap 16 (*)     Urea Nitrogen 12.5      Creatinine 1.08      GFR Estimate >90      Calcium 9.3      Glucose 93     INFLUENZA A/B, RSV AND SARS-COV2 PCR - Abnormal    Influenza A PCR Negative      Influenza B PCR Negative      RSV PCR Negative      SARS CoV2 PCR Positive (*)    CBC WITH PLATELETS - Normal    WBC Count 7.1      RBC Count 5.26      Hemoglobin 16.1      Hematocrit 44.9      MCV 85      MCH 30.6      MCHC 35.9      RDW 11.9      Platelet Count 155     D DIMER QUANTITATIVE - Normal    D-Dimer Quantitative <=0.27     TROPONIN T, HIGH SENSITIVITY - Normal    Troponin T, High Sensitivity <6           RADIOLOGY    Chest XR,  PA & LAT   Final Result   IMPRESSION: No focal airspace consolidation. Mild elevated right hemidiaphragm. No pleural effusion or pneumothorax.      Heart size is normal.           DISCHARGE MEDS  Discharge Medication List as of 1/14/2025  2:27 AM        START taking these medications    Details   nirmatrelvir and ritonavir (PAXLOVID) 300 mg/100 mg therapy pack Take 3 tablets by mouth 2 times daily for 5 days., Disp-30 tablet, R-0, E-PrescribeDate of symptom onset: 1/13; Risk criteria met: No; Weight >40 kg Yes; Renal fxn: normal;  Drug-Drug interactions reviewed & addressed: Yes             I, Ramirez Shipley, am serving as a scribe to document services personally performed by Yenni Ch MD, based on my observations and the provider's statements to me. I, Yenni Ch MD attest that Ramirez Shipley is acting in a scribe capacity, has observed my performance of the services and has documented them in accordance with my direction.      Yenni Ch MD  [facility] EMERGENCY ROOM  [address] [address] [phone]         Yenni Ch MD  01/14/25 0351

## 2025-01-14 NOTE — TELEPHONE ENCOUNTER
Reason for Disposition    Fever > 103 F (39.4 C)    Additional Information    Negative: SEVERE difficulty breathing (e.g., struggling for each breath, speaks in single words)    Negative: Difficult to awaken or acting confused (e.g., disoriented, slurred speech)    Negative: Bluish (or gray) lips or face now    Negative: Shock suspected (e.g., cold/pale/clammy skin, too weak to stand, low BP, rapid pulse)    Negative: Sounds like a life-threatening emergency to the triager    Negative: [1] Diagnosed or suspected COVID-19 AND [2] symptoms lasting 3 or more weeks    Negative: [1] COVID-19 exposure AND [2] no symptoms    Negative: COVID-19 vaccine reaction suspected (e.g., fever, headache, muscle aches) occurring 1 to 3 days after getting vaccine    Negative: COVID-19 vaccine, questions about    Negative: [1] Exposure to someone known to have influenza (flu test positive) AND [2] flu-like symptoms (e.g., cough, runny nose, sore throat, SOB; with or without fever)    Negative: [1] Possible COVID-19 symptoms AND [2] triager concerned about severity of symptoms or other causes    Negative: COVID-19 and breastfeeding, questions about    Negative: SEVERE or constant chest pain or pressure  (Exception: Mild central chest pain, present only when coughing.)    Negative: MODERATE difficulty breathing (e.g., speaks in phrases, SOB even at rest, pulse 100-120)    Negative: [1] Headache AND [2] stiff neck (can't touch chin to chest)    Negative: Oxygen level (e.g., pulse oximetry) 90% or lower    Negative: Chest pain or pressure  (Exception: MILD central chest pain, present only when coughing.)    Negative: [1] Drinking very little AND [2] dehydration suspected (e.g., no urine > 12 hours, very dry mouth, very lightheaded)    Negative: Patient sounds very sick or weak to the triager    Negative: MILD difficulty breathing (e.g., minimal/no SOB at rest, SOB with walking, pulse <100)    Protocols used: COVID-19 - Diagnosed or  [id number]

## 2025-01-14 NOTE — ED PROVIDER NOTES
Emergency Department Encounter   NAME: Phoenix Win ; AGE: 34 year old male ; YOB: 1990 ; MRN: [id number] ; PCP: Navin Kinney   ED PROVIDER: Zamzam Omer PA-C    Evaluation Date & Time:   No admission date for patient encounter.    CHIEF COMPLAINT:  Covid Concern      Impression and Plan   MDM: Phoenix Win is a 34 year old male who presents to the ED for evaluation of shortness of breath and chest pain in the setting of positive covid.   Appears well on initial exam and physical exam unremarkable.  He is tachycardic with heart rate of 119 and febrile with a temperature of 100 degrees.  Patient given Toradol and Tylenol for body aches and fever.    His he had a positive COVID test prior to arrival differentials include COVID-19, influenza, other viral URI, pneumonia, did consider other emergent causes of chest pain and shortness of breath including ACS, PE, electrolyte disturbance, pneumothorax, CHF, asthma or copd exacerbation.     D-dimer unremarkable.  PE unlikely.  Electrolytes within normal limits.  EKG without any acute ST or T wave changes.  Troponin less than 6.  ACS unlikely.  Chest x-ray is normal without any acute infiltrate to suggest pneumonia or other abnormality such as pneumothorax.  No pulmonary edema to suggest CHF as a cause of shortness of breath.  BNP not ordered as low suspicion for CHF initially without any history as well as no increased swelling in the lower extremities or abnormal lung sounds to suggest fluid overload.  Unlikely asthma/copd exacerbation with no history of either and no wheezing on exam.     With history of positive COVID test at home I suspect that all of his symptoms are due to COVID.    Discussed this with patient along with use of Paxlovid and patient would like to opt for use of Paxlovid.  I agree this is reasonable with his seizure disorder and his recent onset of symptoms I do believe it would benefit him at this time. Did review his  "seizure medications and no interactions with paxlovid.      Following this, I did order a COVID test to confirm that it is positive and if so will send him with Paxlovid for home.    Throughout ED course prior to patient receiving the chest x-ray, I was notified that he had a seizure.  I assessed the patient.  He did not seem postictal.  He was alert and oriented.  His significant other states that it was a very \"normal\" seizure for him.  She states he has a several per day.  She described it as his \"eyes rolling around\" without any jerking or tonic clonic component.  This was completely resolved by the time I saw patient.    His COVID test is still pending at the end of my shift, I signed patient out to  who will follow-up after the presumed positive COVID test.  Sent Paxlovid to his pharmacy.  Discussed return precautions including acute worsening difficulty breathing or chest pain or other emergency concerns.  Otherwise patient to follow-up with his primary care.  Tachycardia did improve to 100 prior to discharge.  Temperature not retaken.    Medical Decision Making  Obtained supplemental history:Supplemental history obtained?: No  Reviewed external records: External records reviewed?: Documented in chart  Care impacted by chronic illness:Other: epilepsy  Did you consider but not order tests?: Work up considered but not performed and documented in chart, if applicable  Did you interpret images independently?: Independent interpretation of ECG and images noted in documentation, when applicable.  Consultation discussion with other provider:Did you involve another provider (consultant, MH, pharmacy, etc.)?: I discussed the care with another health care provider, see documentation for details.  Discharge. I prescribed additional prescription strength medication(s) as charted. See documentation for any additional details.    MIPS: Not Applicable      Critical Care: 0    ED COURSE:  10:20 PM I met and " introduced myself to the patient. I gathered initial history and performed my physical exam. We discussed plan for initial workup.   Patient signed out to Dr. Jackson     At the conclusion of the encounter I discussed the results of all the tests and the disposition. The questions were answered. The patient or family acknowledged understanding and was agreeable with the care plan.    FINAL IMPRESSION:    ICD-10-CM    1. COVID-19  U07.1             MEDICATIONS GIVEN IN THE EMERGENCY DEPARTMENT:  Medications   acetaminophen (TYLENOL) tablet 975 mg (975 mg Oral $Given 1/13/25 2179)   ketorolac (TORADOL) injection 15 mg (15 mg Intravenous $Given 1/13/25 2921)   nirmatrelvir and ritonavir (PAXLOVID) 300 mg/100 mg therapy pack 3 tablet (3 tablets Oral $Given 1/14/25 9324)         NEW PRESCRIPTIONS STARTED AT TODAY'S ED VISIT:  Discharge Medication List as of 1/14/2025  2:27 AM        START taking these medications    Details   nirmatrelvir and ritonavir (PAXLOVID) 300 mg/100 mg therapy pack Take 3 tablets by mouth 2 times daily for 5 days., Disp-30 tablet, R-0, E-PrescribeDate of symptom onset: 1/13; Risk criteria met: No; Weight >40 kg Yes; Renal fxn: normal;  Drug-Drug interactions reviewed & addressed: Yes               HPI   Use of Intrepreter: N/A     Phoenix Win is a 34 year old male with a pertinent history of epilepsy who presents to the ED by walk-in for evaluation of covid.    24 hours ago, the patient started having a fever, sore throat, lower chest pressure and difficulty breathing (worse with exertion), and congestion. Tylenol and ibuprofen have helped a bit. The symptoms have persisted, and he tested positive for covid today. He presented for evaluation due to this.    The patient has no sick contacts. He had covid in 2021, and is not up to date on covid vaccinations. He has a history of pneumonia with lung damage from the most recent bout in 2015.     He has a history of epilepsy. He is on lacosamide,  "Kepra, and lorazepam.     The patient denies recent travel, recent hospitalization, history of clotting, as well as denying abdominal pain, nausea, vomiting, diarrhea, and hemoptysis.       Medical History     Past Medical History:   Diagnosis Date    Seizure (H)        History reviewed. No pertinent surgical history.    History reviewed. No pertinent family history.    Social History     Tobacco Use    Smoking status: Never    Smokeless tobacco: Never   Substance Use Topics    Alcohol use: No    Drug use: No       nirmatrelvir and ritonavir (PAXLOVID) 300 mg/100 mg therapy pack  Brivaracetam (BRIVIACT) 100 MG tablet  calcium citrate-vitamin D (CALCIUM CITRATE-VITAMIN D3) 315-6.25 MG-MCG TABS per tablet  calcium-vitamin D 500 mg(1,250mg) -200 unit per tablet  diazepam (VALIUM) 1 MG/ML solution  ibuprofen (ADVIL,MOTRIN) 200 MG tablet  lacosamide (VIMPAT) 100 mg Tab  levETIRAcetam (KEPPRA PO)  LORazepam (ATIVAN) 2 MG/ML (HIGH CONC) oral solution  VALTOCO 10 MG DOSE 10 MG/0.1ML LIQD          Physical Exam     First Vitals:  Patient Vitals for the past 24 hrs:   BP Temp Temp src Pulse Resp SpO2 Height Weight   01/14/25 0225 120/84 -- -- 100 27 96 % -- --   01/14/25 0030 123/78 -- -- 88 28 96 % -- --   01/13/25 2154 (!) 142/95 100  F (37.8  C) Oral 119 26 97 % 1.727 m (5' 8\") 108.9 kg (240 lb)         PHYSICAL EXAM:   Physical Exam    Constitutional: alert,  no acute distress,  not ill-appearing  Head: normocephalic, atraumatic  Eyes: EOM intact   Neck: ROM normal  Cardio: tachycardic, regular rhythm, no murmurs   Pulmonary: effort normal, lung sounds normal, no wheezing, crackles, or rales    Abdominal: flat, no distention  MSK: no obvious swelling or deformity  Skin: no visible rashes or erythema   Neuro: no gross focal deficit, acting per baseline   Psychiatric: mood and behavior within normal limit    Results     LAB:  All pertinent labs reviewed and interpreted  Labs Ordered and Resulted from Time of ED Arrival to " Time of ED Departure   BASIC METABOLIC PANEL - Abnormal       Result Value    Sodium 142      Potassium 3.6      Chloride 104      Carbon Dioxide (CO2) 22      Anion Gap 16 (*)     Urea Nitrogen 12.5      Creatinine 1.08      GFR Estimate >90      Calcium 9.3      Glucose 93     INFLUENZA A/B, RSV AND SARS-COV2 PCR - Abnormal    Influenza A PCR Negative      Influenza B PCR Negative      RSV PCR Negative      SARS CoV2 PCR Positive (*)    CBC WITH PLATELETS - Normal    WBC Count 7.1      RBC Count 5.26      Hemoglobin 16.1      Hematocrit 44.9      MCV 85      MCH 30.6      MCHC 35.9      RDW 11.9      Platelet Count 155     D DIMER QUANTITATIVE - Normal    D-Dimer Quantitative <=0.27     TROPONIN T, HIGH SENSITIVITY - Normal    Troponin T, High Sensitivity <6          RADIOLOGY:  Chest XR,  PA & LAT   Final Result   IMPRESSION: No focal airspace consolidation. Mild elevated right hemidiaphragm. No pleural effusion or pneumothorax.      Heart size is normal.        EKG reviewed and interpreted by Dr. Hanna, ED MD    PROCEDURES:  None       I, Gopi Castorena, am serving as a scribe to document services personally performed by Zamzam Omer PA-C, based on my observation and the provider's statements to me. I, Zamzam Omer PA-C attest that Gopi Castorena is acting in a scribe capacity, has observed my performance of the services and has documented them in accordance with my direction.       Zamzam Omer PA-C   Emergency Medicine   [facility] EMERGENCY ROOM       Zamzam Omer PA-C  01/14/25 4234       Zmazam Omer PA-C  01/14/25 4757

## 2025-01-14 NOTE — ED NOTES
[facility]  [address] [address]      Adriano Beaver :1993 MRN:[id number]    2022 Time Session Began: 14:00  Time Session Ended: 14:56    Due to COVID-19 precautions, this visit was performed via live interactive two-way Video visit with patient's verbal consent.   Clinician Location:Home.  Patient Location: parked car.  Verified patient identity:  [x] Yes    Session Type:Therapy 53+ minutes (70997)    Others Present: no    Intervention: Behavioral, Cognitive, Insight, Supportive    Suicide/Homicide/Violence Ideation: No    If Yes, explain: n/a    Current Outpatient Medications   Medication Sig   • eletriptan (Relpax) 20 MG tablet Take 1 tablet by mouth at onset of migraine. May repeat after 2 hours if needed.   • topiramate (TOPAMAX) 100 MG tablet Take 1 tablet by mouth nightly.   • sertraline (ZOLOFT) 100 MG tablet Take 1 tablet by mouth daily.   • mometasone-formoterol (Dulera) 100-5 MCG/ACT inhaler Inhale 2 puffs into the lungs 2 times daily.   • albuterol 108 (90 Base) MCG/ACT inhaler Inhale 2 puffs into the lungs every 4 hours as needed for Wheezing.   • albuterol (VENTOLIN) (2.5 MG/3ML) 0.083% nebulizer solution Take 3 mLs by nebulization every 6 hours as needed for Wheezing.   • levonorgestrel (MIRENA, 52 MG,) (52 MG) 20 MCG/DAY intrauterine device 1 each by Intrauterine route once.     No current facility-administered medications for this visit.       Change in Medication(s) Reported: No  If Yes, explain: n/a    Patient/Family Education Provided: Yes  Patient/Family Displays Understanding: Yes     If No, explain: n/a     Chief complaint in patient's own words: \"The last time I saw a therapist was before COVID. I felt like I shouldn't have stopped. I felt like I have some deeper things I need to work through.\"  \"My dad and I used to have a close relationship. With his girlfriend [of 18 years] he prioritizes her. I’m  Patient discharged with mom after reviewing the AVS.  No questions or concerns at this time.  Patient and mother comfortable and agree with plan.    now. We have a great relationship. He’s my best friend. I shut down [regarding past difficulties with ex-partner].\"      Progress Note containing chief complaint and symptoms/problems related to the complaint:      D:  Pt identified herself as a 28-year-old, left hand dominant, , heterosexual, English speaking,  female. Pt presented to psychotherapy session on time and independently. Pt reported, \"It's hard to be supportive of someone without being dragged down.\"   A:  This provider gathered additional information related to presenting concerns. Facilitated discussion on recent experiences and sources of distress and grief processing. Cognitions and emotions were acknowledged and explored. Provided psychoeducation on interpersonal effectiveness. Reinforced Pt's effective behaviors and progress on utilizing her voice, assertiveness. Recommended Pt address balance and self-care. Encouraged Pt to monitor Pt’s maladaptive thoughts and subsequent physiological responses. Provided support, validation, and reflection for what was shared.   R:  Pt appeared well-oriented and alert. Pt participated in session and appeared attentive and cooperative. Speech was within normal volume and rate. Mood appeared dysthymic affect broad, congruent to content. Thought process appeared goal-directed and linear. Remote and recent memory appeared intact. Insight and judgment appeared good. Pt endorsed follow through. Pt receptive towards intervention and recommendation.   P:  Will meet for individual psychotherapy. Will provide psychoeducation and integrative approach to treatment to address Pt's concerns.      Need for Community Resources Assessed: Yes     Resources Needed: No     If Yes, what resources: n/a     Primary Diagnosis: Trauma and stressor-related disorder  (primary encounter diagnosis)     Treatment Plan: See Treatment Plan     Discharge Plan: Strategies Discussed to Maintain Gains    Next Appointment:  06/29/2022  Aurelia Key PSYD

## 2025-01-15 ENCOUNTER — PATIENT OUTREACH (OUTPATIENT)
Dept: FAMILY MEDICINE | Facility: CLINIC | Age: 35
End: 2025-01-15
Payer: COMMERCIAL

## 2025-01-15 NOTE — TELEPHONE ENCOUNTER
Transitions of Care Outreach  Chief Complaint   Patient presents with    Hospital F/U       Most Recent Admission Date: 1/14/2025   Most Recent Admission Diagnosis:      Most Recent Discharge Date: 1/14/2025   Most Recent Discharge Diagnosis: COVID-19 - U07.1     Transitions of Care Assessment    Discharge Assessment  How are you doing now that you are home?: still doing ok. denies any worsening symptoms, states staying the same. Has picked up paxlovid.  How are your symptoms? (Red Flag symptoms escalate to triage hotline per guidelines): Unchanged  Do you know how to contact your clinic care team if you have future questions or changes to your health status? : Yes  Does the patient have their discharge instructions? : Yes  Does the patient have questions regarding their discharge instructions? : No  Were you started on any new medications or were there changes to any of your previous medications? : Yes  Does the patient have all of their medications?: Yes  Do you have questions regarding any of your medications? : No  Do you have all of your needed medical supplies or equipment (DME)?  (i.e. oxygen tank, CPAP, cane, etc.): Yes    Follow up Plan     Discharge Follow-Up  Discharge follow up appointment scheduled in alignment with recommended follow up timeframe or Transitions of Risk Category? (Low = within 30 days; Moderate= within 14 days; High= within 7 days): Yes  Discharge Follow Up Appointment Date: 01/30/25 (patient declined sooner appt. Advised to call clinic or return to ER with worsening symptoms, or if symptoms do not improve. Patient endorses understanding and agrees with plan.)  Discharge Follow Up Appointment Scheduled with?: Primary Care Provider    Future Appointments   Date Time Provider Department Center   1/30/2025  9:00 AM Cinthia Mayer APRN CNP [facility]       Outpatient Plan as outlined on AVS reviewed with patient.    For any urgent concerns, please contact our 24 hour nurse triage  line: [phone] ([phone])       Delmis BERNAL RN

## 2025-05-15 ENCOUNTER — OFFICE VISIT (OUTPATIENT)
Dept: FAMILY MEDICINE | Facility: CLINIC | Age: 35
End: 2025-05-15
Payer: COMMERCIAL

## 2025-05-15 VITALS
HEART RATE: 81 BPM | HEIGHT: 68 IN | DIASTOLIC BLOOD PRESSURE: 82 MMHG | RESPIRATION RATE: 20 BRPM | TEMPERATURE: 98.6 F | SYSTOLIC BLOOD PRESSURE: 120 MMHG | OXYGEN SATURATION: 96 % | WEIGHT: 239.9 LBS | BODY MASS INDEX: 36.36 KG/M2

## 2025-05-15 DIAGNOSIS — G40.209 LOCALIZATION-RELATED (FOCAL) (PARTIAL) SYMPTOMATIC EPILEPSY AND EPILEPTIC SYNDROMES WITH COMPLEX PARTIAL SEIZURES, NOT INTRACTABLE, WITHOUT STATUS EPILEPTICUS (H): Chronic | ICD-10-CM

## 2025-05-15 DIAGNOSIS — E78.1 HYPERTRIGLYCERIDEMIA: ICD-10-CM

## 2025-05-15 DIAGNOSIS — Z11.59 NEED FOR HEPATITIS C SCREENING TEST: ICD-10-CM

## 2025-05-15 DIAGNOSIS — Z00.00 ROUTINE GENERAL MEDICAL EXAMINATION AT A HEALTH CARE FACILITY: Primary | ICD-10-CM

## 2025-05-15 DIAGNOSIS — F44.5 CONVERSION DISORDER WITH SEIZURES OR CONVULSIONS: ICD-10-CM

## 2025-05-15 DIAGNOSIS — Z79.899 OTHER LONG TERM (CURRENT) DRUG THERAPY: ICD-10-CM

## 2025-05-15 DIAGNOSIS — Z11.4 SCREENING FOR HIV (HUMAN IMMUNODEFICIENCY VIRUS): ICD-10-CM

## 2025-05-15 DIAGNOSIS — R73.9 HYPERGLYCEMIA: ICD-10-CM

## 2025-05-15 LAB
ANION GAP SERPL CALCULATED.3IONS-SCNC: 13 MMOL/L (ref 7–15)
BUN SERPL-MCNC: 15.3 MG/DL (ref 6–20)
CALCIUM SERPL-MCNC: 9.8 MG/DL (ref 8.8–10.4)
CHLORIDE SERPL-SCNC: 105 MMOL/L (ref 98–107)
CHOLEST SERPL-MCNC: 196 MG/DL
CREAT SERPL-MCNC: 1.03 MG/DL (ref 0.67–1.17)
EGFRCR SERPLBLD CKD-EPI 2021: >90 ML/MIN/1.73M2
ERYTHROCYTE [DISTWIDTH] IN BLOOD BY AUTOMATED COUNT: 11.5 % (ref 10–15)
EST. AVERAGE GLUCOSE BLD GHB EST-MCNC: 94 MG/DL
FASTING STATUS PATIENT QL REPORTED: ABNORMAL
FASTING STATUS PATIENT QL REPORTED: NORMAL
GLUCOSE SERPL-MCNC: 97 MG/DL (ref 70–99)
HBA1C MFR BLD: 4.9 % (ref 0–5.6)
HCO3 SERPL-SCNC: 23 MMOL/L (ref 22–29)
HCT VFR BLD AUTO: 47.4 % (ref 40–53)
HDLC SERPL-MCNC: 36 MG/DL
HGB BLD-MCNC: 17.1 G/DL (ref 13.3–17.7)
HIV 1+2 AB+HIV1 P24 AG SERPL QL IA: NONREACTIVE
LDLC SERPL CALC-MCNC: 93 MG/DL
MCH RBC QN AUTO: 30.6 PG (ref 26.5–33)
MCHC RBC AUTO-ENTMCNC: 36.1 G/DL (ref 31.5–36.5)
MCV RBC AUTO: 85 FL (ref 78–100)
NONHDLC SERPL-MCNC: 160 MG/DL
PLATELET # BLD AUTO: 198 10E3/UL (ref 150–450)
POTASSIUM SERPL-SCNC: 4.2 MMOL/L (ref 3.4–5.3)
RBC # BLD AUTO: 5.58 10E6/UL (ref 4.4–5.9)
SODIUM SERPL-SCNC: 141 MMOL/L (ref 135–145)
TRIGL SERPL-MCNC: 336 MG/DL
TSH SERPL DL<=0.005 MIU/L-ACNC: 1.77 UIU/ML (ref 0.3–4.2)
WBC # BLD AUTO: 7.3 10E3/UL (ref 4–11)

## 2025-05-15 RX ORDER — FLUOXETINE 10 MG/1
10 CAPSULE ORAL DAILY
COMMUNITY
Start: 2025-03-11 | End: 2025-05-15

## 2025-05-15 SDOH — HEALTH STABILITY: PHYSICAL HEALTH: ON AVERAGE, HOW MANY DAYS PER WEEK DO YOU ENGAGE IN MODERATE TO STRENUOUS EXERCISE (LIKE A BRISK WALK)?: 3 DAYS

## 2025-05-15 SDOH — HEALTH STABILITY: PHYSICAL HEALTH: ON AVERAGE, HOW MANY MINUTES DO YOU ENGAGE IN EXERCISE AT THIS LEVEL?: 30 MIN

## 2025-05-15 ASSESSMENT — SOCIAL DETERMINANTS OF HEALTH (SDOH): HOW OFTEN DO YOU GET TOGETHER WITH FRIENDS OR RELATIVES?: THREE TIMES A WEEK

## 2025-05-15 NOTE — PROGRESS NOTES
"Preventive Care Visit  [facility]  Jef Nassar DO, Family Medicine  May 15, 2025      Assessment & Plan     Routine general medical examination at a health care facility  Pleasant 34-year-old male with chronic conditions as below  Works as a zelda at [employer]  Lives with his mom and dad  And responding him with his dog  Chronic conditions as below  No concerns today    - REVIEW OF HEALTH MAINTENANCE PROTOCOL ORDERS    Screening for HIV (human immunodeficiency virus)    - HIV Antigen Antibody Combo    Need for hepatitis C screening test    - Hepatitis C Screen Reflex to HCV RNA Quant and Genotype    Localization-related (focal) (partial) symptomatic epilepsy and epileptic syndromes with complex partial seizures, not intractable, without status epilepticus (H)  Conversion disorder with seizures or convulsions  Other long term (current) drug therapy  Pleasant 34-year-old male  He reports his most recent seizure this morning lasted approximately 30 seconds or less  His seizures manifest as convulsions, blackout, memory loss  [place] [facility] [place]  Currently on lacosamide 550 mg/day, Keppra 5000 mg/day, Prozac 20 mg/day as well as multivitamins  Otherwise stable      Hypertriglyceridemia  Frequently elevated  Likely due to nonfasting  Because of patient's medication we will monitor his lipid profile  - Lipid panel reflex to direct LDL Non-fasting; Future  - Lipid panel reflex to direct LDL Non-fasting    Hyperglycemia  As seen in 2022 with a BMI of 36 he is predisposed to metabolic syndrome  Check lab work      - Hemoglobin A1c  - Basic metabolic panel  (Ca, Cl, CO2, Creat, Gluc, K, Na, BUN)  - TSH with free T4 reflex  - CBC with platelets        BMI  Estimated body mass index is 36.49 kg/m  as calculated from the following:    Height as of this encounter: 1.727 m (5' 7.99\").    Weight as of this encounter: 108.8 kg (239 lb 14.4 oz).       Counseling  Appropriate preventive " "services were addressed with this patient via screening, questionnaire, or discussion as appropriate for fall prevention, nutrition, physical activity, Tobacco-use cessation, social engagement, weight loss and cognition.  Checklist reviewing preventive services available has been given to the patient.  Reviewed patient's diet, addressing concerns and/or questions.   He is at risk for lack of exercise and has been provided with information to increase physical activity for the benefit of his well-being.   The patient was instructed to see the dentist every 6 months.           Subjective   Mikael is a 34 year old, presenting for the following:  Physical        5/15/2025    10:43 AM   Additional Questions   Roomed by NITIN Alvarez          HPI    Concerns:     No  Diagnosed with epilepsy in 2001  Follows with [facility]rologist at [facility] [place]  [facility]  [address]  Prozac 20  Most recent surgery was this morning  \"Blackouts, convulsions,and sometimes loss of memory\"  Seizure this morning lasted maybe 30 seconds    PMH  Patient Active Problem List    Diagnosis Date Noted    Spells of decreased attentiveness 07/16/2019     Priority: Medium    Epilepsy with partial complex seizures, without status epilepticus (H) 05/16/2018     Priority: Medium    Complex partial seizures with consciousness impaired (H) 01/10/2018     Priority: Medium    Epilepsy      Priority: Medium     Created by Conversion  Replacement Utility updated for latest IMO load             Meds    Current Outpatient Medications:     calcium citrate-vitamin D (CALCIUM CITRATE-VITAMIN D3) 315-6.25 MG-MCG TABS per tablet, Take 2 tablets by mouth, Disp: , Rfl:     calcium-vitamin D 500 mg(1,250mg) -200 unit per tablet, [CALCIUM-VITAMIN D 500 MG(1,250MG) -200 UNIT PER TABLET] Take 1 tablet by mouth every morning., Disp: , Rfl:     FLUoxetine (PROZAC) 20 MG capsule, , Disp: , Rfl:     ibuprofen (ADVIL,MOTRIN) 200 MG tablet, [IBUPROFEN (ADVIL,MOTRIN) 200 MG TABLET] Take 200-400 " mg by mouth., Disp: , Rfl:     lacosamide (VIMPAT) 100 mg Tab, [LACOSAMIDE (VIMPAT) 100 MG TAB] 550 mg. , Disp: , Rfl:     levETIRAcetam (KEPPRA PO), Take 1,000 mg by mouth Taking 5000 mg, Disp: , Rfl:     LORazepam (ATIVAN) 2 MG/ML (HIGH CONC) oral solution, Take 2 mg by mouth, Disp: , Rfl:      PSH  No past surgical history on file.      Lives with: parents  Work: work as a stocking  at [employer]  Hobbies:  walk dog - [place]aur[place] wireheard pointer, play video games, write fantasy fiction  Diet: no chocolate, no alcohol, no caffeine. No liquid milk  Physical Activity: stretching and calesthetics. Walk around 4 miles a day  Sleep: most days get 8 hours of sleep    GENERAL:  Lost about 10 pounds since february  HEENT: chronic headaches. Notrauma, changes in vision, hearing, nasal congestion, dental pain, neck pain, sore throat.  Dentist:  2020  Eye Doctor: 2020  CARDIAC: Denies chest pain or shortness of breath  LUNG: Denies dyspnea on exertion or chest pain  ABD: Denies pain, nausea, vomiting, diarrhea, constipation  : No changes in bladder habits  SKIN: Denies new rashes  NEURO: chronic headaches and seizures  MSK: No weakness  PSYCH: No changes in mood, no HI or SI    Advance Care Planning    Discussed advance care planning with patient; informed AVS has link to [employer].        5/15/2025   General Health   How would you rate your overall physical health? (!) FAIR   Feel stress (tense, anxious, or unable to sleep) Only a little   (!) STRESS CONCERN      5/15/2025   Nutrition   Three or more servings of calcium each day? Yes   Diet: Regular (no restrictions)   How many servings of fruit and vegetables per day? (!) 2-3   How many sweetened beverages each day? 0-1         5/15/2025   Exercise   Days per week of moderate/strenous exercise 3 days   Average minutes spent exercising at this level 30 min         5/15/2025   Social Factors   Frequency of gathering with friends or relatives Three times a week  "  Worry food won't last until get money to buy more No   Food not last or not have enough money for food? No   Do you have housing? (Housing is defined as stable permanent housing and does not include staying outside in a car, in a tent, in an abandoned building, in an overnight shelter, or couch-surfing.) No   Are you worried about losing your housing? No   Lack of transportation? No   Unable to get utilities (heat,electricity)? No   Want help with housing or utility concern? No   (!) HOUSING CONCERN PRESENT      5/15/2025   Dental   Dentist two times every year? (!) NO         Today's PHQ-2 Score:       5/15/2025    10:08 AM   PHQ-2 ( 1999 [employer])   Q1: Little interest or pleasure in doing things 0   Q2: Feeling down, depressed or hopeless 0   PHQ-2 Score 0    Q1: Little interest or pleasure in doing things Not at all   Q2: Feeling down, depressed or hopeless Not at all   PHQ-2 Score 0       Patient-reported           5/15/2025   Substance Use   Alcohol more than 3/day or more than 7/wk No   Do you use any other substances recreationally? No     Social History     Tobacco Use    Smoking status: Never     Passive exposure: Never    Smokeless tobacco: Never   Vaping Use    Vaping status: Never Used   Substance Use Topics    Alcohol use: No    Drug use: No             5/15/2025   One time HIV Screening   Previous HIV test? No         5/15/2025   STI Screening   New sexual partner(s) since last STI/HIV test? No         5/15/2025   Contraception/Family Planning   Questions about contraception or family planning No        Reviewed and updated as needed this visit by Provider     Meds                   Objective    Exam  /82   Pulse 81   Temp 98.6  F (37  C) (Oral)   Resp 20   Ht 1.727 m (5' 7.99\")   Wt 108.8 kg (239 lb 14.4 oz)   SpO2 96%   BMI 36.49 kg/m     Estimated body mass index is 36.49 kg/m  as calculated from the following:    Height as of this encounter: 1.727 m (5' 7.99\").    Weight as of this " encounter: 108.8 kg (239 lb 14.4 oz).    Physical Exam  Vitals reviewed.   Constitutional:       Appearance: Normal appearance.   HENT:      Head: Normocephalic and atraumatic.      Right Ear: Tympanic membrane, ear canal and external ear normal.      Left Ear: Tympanic membrane, ear canal and external ear normal.      Nose: Nose normal.      Mouth/Throat:      Mouth: Mucous membranes are moist.   Eyes:      Extraocular Movements: Extraocular movements intact.      Pupils: Pupils are equal, round, and reactive to light.   Cardiovascular:      Rate and Rhythm: Normal rate and regular rhythm.      Heart sounds: Normal heart sounds.   Pulmonary:      Effort: Pulmonary effort is normal.      Breath sounds: Normal breath sounds.   Abdominal:      General: Abdomen is flat. Bowel sounds are normal.      Palpations: Abdomen is soft.   Musculoskeletal:      Cervical back: Normal range of motion and neck supple.      Comments: Appropriate strength in tested fields   Skin:     General: Skin is warm and dry.   Neurological:      General: No focal deficit present.      Mental Status: He is alert.   Psychiatric:         Mood and Affect: Mood normal.         Behavior: Behavior normal.       Signed Electronically by: Jef Nassar DO

## 2025-05-15 NOTE — PATIENT INSTRUCTIONS
Thank you for seeing us at [facility].     To Review:  If you are getting lab work today, we will send you a "[employer]hart message with recommendations once these are all returned to us.  X-rays are able to be performed in clinic and we will notify you of the results.  Any other imaging is scheduled and you will be contacted by the scheduling department.  If you do not hear from them in 2 weeks, please call [phone]   If you are getting immunizations today, you may have some arm soreness; you can use tylenol or ibuprofen for this.  If you are getting referrals you should be called within the next 3 to 5 days.    An E visit is an excellent way to get quick evaluation from myself. These can be completed using the  [employer] or online using [employer]. We can evaluate a variety of conditions using this including sinusitis, skin conditions, etc. Please send us a [employer] Message or call if having issues or questions.    Jef Nassar DO, MS  [facility]  [phone]    Patient Education   Preventive Care Advice   This is general advice given by our system to help you stay healthy. However, your care team may have specific advice just for you. Please talk to your care team about your preventive care needs.  Nutrition  Eat 5 or more servings of fruits and vegetables each day.  Try wheat bread, brown rice and whole grain pasta (instead of white bread, rice, and pasta).  Get enough calcium and vitamin D. Check the label on foods and aim for 100% of the RDA (recommended daily allowance).  Lifestyle  Exercise at least 150 minutes each week  (30 minutes a day, 5 days a week).  Do muscle strengthening activities 2 days a week. These help control your weight and prevent disease.  No smoking.  Wear sunscreen to prevent skin cancer.  Have a dental exam and cleaning every 6 months.  Yearly exams  See your health care team every year to talk about:  Any changes in your health.  Any  medicines your care team has prescribed.  Preventive care, family planning, and ways to prevent chronic diseases.  Shots (vaccines)   HPV shots (up to age 26), if you've never had them before.  Hepatitis B shots (up to age 59), if you've never had them before.  COVID-19 shot: Get this shot when it's due.  Flu shot: Get a flu shot every year.  Tetanus shot: Get a tetanus shot every 10 years.  Pneumococcal, hepatitis A, and RSV shots: Ask your care team if you need these based on your risk.  Shingles shot (for age 50 and up)  General health tests  Diabetes screening:  Starting at age 35, Get screened for diabetes at least every 3 years.  If you are younger than age 35, ask your care team if you should be screened for diabetes.  Cholesterol test: At age 39, start having a cholesterol test every 5 years, or more often if advised.  Bone density scan (DEXA): At age 50, ask your care team if you should have this scan for osteoporosis (brittle bones).  Hepatitis C: Get tested at least once in your life.  STIs (sexually transmitted infections)  Before age 24: Ask your care team if you should be screened for STIs.  After age 24: Get screened for STIs if you're at risk. You are at risk for STIs (including HIV) if:  You are sexually active with more than one person.  You don't use condoms every time.  You or a partner was diagnosed with a sexually transmitted infection.  If you are at risk for HIV, ask about PrEP medicine to prevent HIV.  Get tested for HIV at least once in your life, whether you are at risk for HIV or not.  Cancer screening tests  Cervical cancer screening: If you have a cervix, begin getting regular cervical cancer screening tests starting at age 21.  Breast cancer scan (mammogram): If you've ever had breasts, begin having regular mammograms starting at age 40. This is a scan to check for breast cancer.  Colon cancer screening: It is important to start screening for colon cancer at age 45.  Have a colonoscopy  test every 10 years (or more often if you're at risk) Or, ask your provider about stool tests like a FIT test every year or Cologuard test every 3 years.  To learn more about your testing options, visit:   .  For help making a decision, visit:   https://bit.mary/kp36832.  Prostate cancer screening test: If you have a prostate, ask your care team if a prostate cancer screening test (PSA) at age 55 is right for you.  Lung cancer screening: If you are a current or former smoker ages 50 to 80, ask your care team if ongoing lung cancer screenings are right for you.  For informational purposes only. Not to replace the advice of your health care provider. Copyright   2023 [facility] "[employer] All rights reserved. Clinically reviewed by the [facility]. [employer] [id number] - REV 01/24.

## 2025-05-16 ENCOUNTER — RESULTS FOLLOW-UP (OUTPATIENT)
Dept: FAMILY MEDICINE | Facility: CLINIC | Age: 35
End: 2025-05-16

## 2025-05-16 NOTE — RESULT ENCOUNTER NOTE
I have reviewed your diagnostic work   Triglycerides are elevated.  This can be if you are not fasting.  HDL is cardioprotective and slightly decreased.  Please ensure you are getting a good amount of good fats like avocado, eggs, olive oil, fish in your diet  Negative for HIV and hepatitis C  Kidneys and electrolytes look good  Thyroid is within normal limits  You are not prediabetic with an A1c of 4.9  The values of your blood cells are all within normal limits.    Please let us know if you have any questions.    Sincerely,    Dr. Nassar

## 2025-06-23 ENCOUNTER — TRANSFERRED RECORDS (OUTPATIENT)
Dept: HEALTH INFORMATION MANAGEMENT | Facility: CLINIC | Age: 35
End: 2025-06-23
Payer: COMMERCIAL